# Patient Record
Sex: FEMALE | Race: WHITE | NOT HISPANIC OR LATINO | Employment: FULL TIME | URBAN - METROPOLITAN AREA
[De-identification: names, ages, dates, MRNs, and addresses within clinical notes are randomized per-mention and may not be internally consistent; named-entity substitution may affect disease eponyms.]

---

## 2017-05-15 ENCOUNTER — OFFICE VISIT (OUTPATIENT)
Dept: FAMILY MEDICINE | Facility: CLINIC | Age: 27
End: 2017-05-15
Payer: COMMERCIAL

## 2017-05-15 VITALS
HEART RATE: 60 BPM | WEIGHT: 164.6 LBS | DIASTOLIC BLOOD PRESSURE: 61 MMHG | BODY MASS INDEX: 25.78 KG/M2 | SYSTOLIC BLOOD PRESSURE: 117 MMHG

## 2017-05-15 DIAGNOSIS — F43.22 ADJUSTMENT DISORDER WITH ANXIOUS MOOD: Primary | ICD-10-CM

## 2017-05-15 PROCEDURE — 99213 OFFICE O/P EST LOW 20 MIN: CPT | Performed by: FAMILY MEDICINE

## 2017-05-15 ASSESSMENT — ANXIETY QUESTIONNAIRES
GAD7 TOTAL SCORE: 13
2. NOT BEING ABLE TO STOP OR CONTROL WORRYING: NEARLY EVERY DAY
6. BECOMING EASILY ANNOYED OR IRRITABLE: NEARLY EVERY DAY
1. FEELING NERVOUS, ANXIOUS, OR ON EDGE: MORE THAN HALF THE DAYS
7. FEELING AFRAID AS IF SOMETHING AWFUL MIGHT HAPPEN: SEVERAL DAYS
IF YOU CHECKED OFF ANY PROBLEMS ON THIS QUESTIONNAIRE, HOW DIFFICULT HAVE THESE PROBLEMS MADE IT FOR YOU TO DO YOUR WORK, TAKE CARE OF THINGS AT HOME, OR GET ALONG WITH OTHER PEOPLE: SOMEWHAT DIFFICULT
5. BEING SO RESTLESS THAT IT IS HARD TO SIT STILL: NOT AT ALL
3. WORRYING TOO MUCH ABOUT DIFFERENT THINGS: MORE THAN HALF THE DAYS

## 2017-05-15 ASSESSMENT — PATIENT HEALTH QUESTIONNAIRE - PHQ9: 5. POOR APPETITE OR OVEREATING: MORE THAN HALF THE DAYS

## 2017-05-15 NOTE — PROGRESS NOTES
SUBJECTIVE:                                                    Shonda Valdivia is a 27 year old female who presents to clinic today for the following health issues:    Chief Complaint   Patient presents with     Anxiety     New issue, she states her and her boyfriend have been together a little over a year and she has been staying with at his house. Last wednesday she stayed at her moms house and woke up feeling like she wanted nothing to do with him. She also states over the last week she feels disconnected and when she went out with friends she found herself distant and not paying attention.      Shonda Valdivia is a 27 year old female employed as a  at the [employer].  She is still living with her parents, but she has been in a romantic relationship for about a year now, has been spending more nights each week at her boyfriend's house, was considering moving in, and the couple had even talked about getting . She reports that all of a sudden she woke up one morning with the strong feeling that this was all a mistake and that she didn't really want to have anything to do with him.  She has shared these feelings, and states her boyfriend and her parents are giving her space to work out her feelings. She has made an appointment for counseling with Lupe Sales in [place] for next Monday.    ONe pertinent factor is that when she was 20, she was in another longterm relationship, and at about the same point in the relationship, one year in, she felt the same way, that she had nothing in common with her partner and needed to get out of the relationship. She is concerned that she is forming a pattern of abandonment, though in retrospect she still thinks her former boyfriend would not have been a good match.    She does have dreams of eventual marriage and children.    One other factor is that her current significant other was diagnosed in the past with a brain tumor. He has had  surgery and radiation and chemotherapy, and has been told the tumor is shrinking, but his longterm prognosis is not clear. He is employed, owns his house.  She gets along well with his family, who already treat her like a daughter, and he gets along well with her parents. She has had him come to the house to visit, but is no longer staying over with him until she gets things figured out.    She has never lived on her own, has always stayed in her parental home or with a boyfriend. She has never really thought about having period of independence.    She has two siblings, a younger brother who just got  now after he returned from deployment, and one older sister who has one child and is pregnant again.    OBJECTIVE: /61 (BP Location: Right arm, Patient Position: Chair, Cuff Size: Adult Regular)  Pulse 60  Wt 164 lb 9.6 oz (74.7 kg)  BMI 25.78 kg/m2    She is pleasant, articulate, appears concerned and confused over her feelings and options.    ASSESSMENT: adjustment disorder with anxious mood    PLAN: I applauded her decision to seek counseling to help her understand her options and her values and concerns.  At this point, no pharmaceutical intervention is indicated.  Recheck in one month PRN.    Bouchra Manzo md

## 2017-05-15 NOTE — MR AVS SNAPSHOT
"              After Visit Summary   5/15/2017    Shonda Valdivia    MRN: [id number]           Patient Information     Date Of Birth          1990        Visit Information        Provider Department      5/15/2017 1:40 PM Bouchra Manzo MD [facility]        Today's Diagnoses     Adjustment disorder with anxious mood    -  1      Care Instructions    Keep the counseling appointment.  Recheck in one month if needed.        Follow-ups after your visit        Your next 10 appointments already scheduled     Jun 01, 2017  9:00 AM CDT   PHYSICAL with Bouchra Manzo MD   [facility] ([facility])    [address]  [facility] [address]   [phone]              Who to contact     If you have questions or need follow up information about today's clinic visit or your schedule please contact [facility] directly at [phone].  Normal or non-critical lab and imaging results will be communicated to you by MyChart, letter or phone within 4 business days after the clinic has received the results. If you do not hear from us within 7 days, please contact the clinic through [employer] or phone. If you have a critical or abnormal lab result, we will notify you by phone as soon as possible.  Submit refill requests through [email or web address] or call your pharmacy and they will forward the refill request to us. Please allow 3 business days for your refill to be completed.          Additional Information About Your Visit        [employer] Information     [email or web address] lets you send messages to your doctor, view your test results, renew your prescriptions, schedule appointments and more. To sign up, go to [email or web address] . Click on \"Log in\" on the left side of the screen, which will take you to the Welcome page. Then click on \"Sign up Now\" on the right side of the page.     You will be asked to enter the access code listed below, as well as some " personal information. Please follow the directions to create your username and password.     Your access code is: [email or web address]  Expires: 2017  2:17 PM     Your access code will  in 90 days. If you need help or a new code, please call your [place] clinic or [phone].        Care EveryWhere ID     This is your Care EveryWhere ID. This could be used by other organizations to access your [place] medical records  [id number]        Your Vitals Were     Pulse BMI (Body Mass Index)                60 25.78 kg/m2           Blood Pressure from Last 3 Encounters:   05/15/17 117/61   10/17/16 110/62   06/09/15 110/60    Weight from Last 3 Encounters:   05/15/17 164 lb 9.6 oz (74.7 kg)   10/17/16 167 lb 3.2 oz (75.8 kg)   06/09/15 160 lb (72.6 kg)              Today, you had the following     No orders found for display       Primary Care Provider Office Phone # Fax #    R Eduardo Gandara [phone]       [place] [address]        Thank you!     Thank you for choosing [facility]  for your care. Our goal is always to provide you with excellent care. Hearing back from our patients is one way we can continue to improve our services. Please take a few minutes to complete the written survey that you may receive in the mail after your visit with us. Thank you!             Your Updated Medication List - Protect others around you: Learn how to safely use, store and throw away your medicines at www.disposemymeds.org.      Notice  As of 5/15/2017  2:17 PM    You have not been prescribed any medications.

## 2017-05-16 ASSESSMENT — PATIENT HEALTH QUESTIONNAIRE - PHQ9: SUM OF ALL RESPONSES TO PHQ QUESTIONS 1-9: 14

## 2017-05-16 ASSESSMENT — ANXIETY QUESTIONNAIRES: GAD7 TOTAL SCORE: 13

## 2017-06-22 ENCOUNTER — OFFICE VISIT (OUTPATIENT)
Dept: FAMILY MEDICINE | Facility: CLINIC | Age: 27
End: 2017-06-22
Payer: COMMERCIAL

## 2017-06-22 VITALS
WEIGHT: 165 LBS | HEART RATE: 68 BPM | HEIGHT: 67 IN | BODY MASS INDEX: 25.9 KG/M2 | DIASTOLIC BLOOD PRESSURE: 77 MMHG | SYSTOLIC BLOOD PRESSURE: 111 MMHG | RESPIRATION RATE: 18 BRPM

## 2017-06-22 DIAGNOSIS — B07.8 COMMON WART: Primary | ICD-10-CM

## 2017-06-22 PROCEDURE — 17110 DESTRUCTION B9 LES UP TO 14: CPT | Performed by: FAMILY MEDICINE

## 2017-06-22 NOTE — NURSING NOTE
"Chief Complaint   Patient presents with     Derm Problem     Patient has several warts on right leg that she has had removed and frooze and they return.        Initial /77  Pulse 68  Resp 18  Ht 5' 7\" (1.702 m)  Wt 165 lb (74.8 kg)  Breastfeeding? No  BMI 25.84 kg/m2 Estimated body mass index is 25.84 kg/(m^2) as calculated from the following:    Height as of this encounter: 5' 7\" (1.702 m).    Weight as of this encounter: 165 lb (74.8 kg).  Medication Reconciliation: complete    "

## 2017-06-22 NOTE — PROGRESS NOTES
SUBJECTIVE:                                                    Shonda Valdivia is a 27 year old female who presents to clinic today for the following health issues:      Chief Complaint   Patient presents with     Derm Problem     Patient has several warts on right leg that she has had removed and frooze and they return.      Shonda Valdivia is a 27 year old female has had 3 wart(s) for several month(s) and has not tried over-the counter anti-wart medications.  There is no history of infection or injury.  This is the patient's first treatment.  Has had other warts treated in this general location before.     O: The patient appears today in no apparent distress.  Vitals as above.  Skin: three non-erythematous, raised papule with pinpoint hemmorhages measuring 1mm-5mm is seen on the right distal medial thigh.       A: Common Wart(s).    P:  Each wart was frozen easily three times with liquid nitrogen.  A total of 3 warts are treated today.  The etiology of common warts were discussed.  Shonda will continue to use the over-the-counter medications such as Compound W under occlusion on a nightly  basis.  Warm soapy water soaks and sanding also recommended.  The patient is to return every two weeks until all warts have    Zulay Mark M.D.

## 2017-06-22 NOTE — PATIENT INSTRUCTIONS
Thank you for choosing [facility].  You may be receiving a survey in the mail from [facility] regarding your visit today.  Please take a few minutes to complete and return the survey to let us know how we are doing.      Our Clinic hours are:  Mondays    7:20 am - 7 pm  Tues -  Fri  7:20 am - 5 pm    Clinic Phone: [phone]    The clinic lab opens at 7:30 am Mon - Fri and appointments are required.    [facility]. [phone]  Monday-Thursday 8 am - 7pm  Tues/Wed/Fri 8 am - 5:30 pm

## 2017-06-22 NOTE — MR AVS SNAPSHOT
"              After Visit Summary   6/22/2017    Shonda Valdivia    MRN: [id number]           Patient Information     Date Of Birth          1990        Visit Information        Provider Department      6/22/2017 2:20 PM Zulay Mark MD [facility]        Today's Diagnoses     Common wart    -  1      Care Instructions          Thank you for choosing [facility].  You may be receiving a survey in the mail from [facility] regarding your visit today.  Please take a few minutes to complete and return the survey to let us know how we are doing.      Our Clinic hours are:  Mondays    7:20 am - 7 pm  Tues -  Fri  7:20 am - 5 pm    Clinic Phone: [phone]    The clinic lab opens at 7:30 am Mon - Fri and appointments are required.    [facility]  Ph. [phone]  Monday-Thursday 8 am - 7pm  Tues/Wed/Fri 8 am - 5:30 pm                 Follow-ups after your visit        Who to contact     If you have questions or need follow up information about today's clinic visit or your schedule please contact [facility] directly at [phone].  Normal or non-critical lab and imaging results will be communicated to you by MyChart, letter or phone within 4 business days after the clinic has received the results. If you do not hear from us within 7 days, please contact the clinic through MyChart or phone. If you have a critical or abnormal lab result, we will notify you by phone as soon as possible.  Submit refill requests through [employer] or call your pharmacy and they will forward the refill request to us. Please allow 3 business days for your refill to be completed.          Additional Information About Your Visit        MyChart Information     [employer] lets you send messages to your doctor, view your test results, renew your prescriptions, schedule appointments and more. To sign up, go to [email or web address] . Click on \"Log in\" on the left side of the " "screen, which will take you to the Welcome page. Then click on \"Sign up Now\" on the right side of the page.     You will be asked to enter the access code listed below, as well as some personal information. Please follow the directions to create your username and password.     Your access code is: [email or web address]  Expires: 2017  2:17 PM     Your access code will  in 90 days. If you need help or a new code, please call your [place] clinic or [phone].        Care EveryWhere ID     This is your Care EveryWhere ID. This could be used by other organizations to access your [place] medical records  [id number]        Your Vitals Were     Pulse Respirations Height Breastfeeding? BMI (Body Mass Index)       68 18 5' 7\" (1.702 m) No 25.84 kg/m2        Blood Pressure from Last 3 Encounters:   17 111/77   05/15/17 117/61   10/17/16 110/62    Weight from Last 3 Encounters:   17 165 lb (74.8 kg)   05/15/17 164 lb 9.6 oz (74.7 kg)   10/17/16 167 lb 3.2 oz (75.8 kg)              We Performed the Following     DESTRUCT BENIGN LESION, UP TO 14        Primary Care Provider Office Phone # Fax #    R Eduardo Gandara [phone]       [place] [address]        Equal Access to Services     BO BAINS AH: Hadii daniel aguilerao Soclari, waaxda luqadaha, qaybta kaalmada adeegyada, lani albarado. So [facility] [phone].    ATENCIÓN: Si habla español, tiene a donis disposición servicios gratuitos de asistencia lingüística. Llame al [phone].    We comply with applicable federal civil rights laws and [place] laws. We do not discriminate on the basis of race, color, national origin, age, disability sex, sexual orientation or gender identity.            Thank you!     Thank you for choosing [facility]  for your care. Our goal is always to provide you with excellent care. Hearing back from our patients is one way " we can continue to improve our services. Please take a few minutes to complete the written survey that you may receive in the mail after your visit with us. Thank you!             Your Updated Medication List - Protect others around you: Learn how to safely use, store and throw away your medicines at www.disposemymeds.org.      Notice  As of 6/22/2017  2:50 PM    You have not been prescribed any medications.

## 2017-10-06 ENCOUNTER — HOSPITAL ENCOUNTER (EMERGENCY)
Facility: CLINIC | Age: 27
Discharge: HOME | End: 2017-10-06
Admitting: FAMILY MEDICINE
Payer: COMMERCIAL

## 2017-10-06 VITALS
HEART RATE: 63 BPM | RESPIRATION RATE: 16 BRPM | OXYGEN SATURATION: 99 % | SYSTOLIC BLOOD PRESSURE: 161 MMHG | TEMPERATURE: 98.1 F | DIASTOLIC BLOOD PRESSURE: 89 MMHG

## 2017-10-06 LAB
DEPRECATED S PYO AG THROAT QL EIA: NORMAL
SPECIMEN SOURCE: NORMAL

## 2017-10-06 PROCEDURE — 87880 STREP A ASSAY W/OPTIC: CPT | Performed by: FAMILY MEDICINE

## 2017-10-06 PROCEDURE — 40000268 ZZH STATISTIC NO CHARGES

## 2017-10-06 PROCEDURE — 87081 CULTURE SCREEN ONLY: CPT | Performed by: FAMILY MEDICINE

## 2017-10-08 LAB
BACTERIA SPEC CULT: NORMAL
SPECIMEN SOURCE: NORMAL

## 2018-01-10 ENCOUNTER — TELEPHONE (OUTPATIENT)
Dept: FAMILY MEDICINE | Facility: CLINIC | Age: 28
End: 2018-01-10

## 2018-01-10 DIAGNOSIS — Z13.9 SCREENING FOR CONDITION: ICD-10-CM

## 2018-01-10 DIAGNOSIS — Z13.9 SCREENING FOR CONDITION: Primary | ICD-10-CM

## 2018-01-10 PROCEDURE — 86787 VARICELLA-ZOSTER ANTIBODY: CPT | Performed by: FAMILY MEDICINE

## 2018-01-10 PROCEDURE — 36415 COLL VENOUS BLD VENIPUNCTURE: CPT | Performed by: FAMILY MEDICINE

## 2018-01-10 NOTE — TELEPHONE ENCOUNTER
Reason for Call: Request for an order or referral:    Order or referral being requested: Titer    Date needed: as soon as possible    Has the patient been seen by the PCP for this problem? NO    Additional comments: Pt needs to prove immunity to chicken pox for school. Could she please get an order for a Titer?    Phone number Patient can be reached at:  Home number on file [phone] (home)    Best Time:  any    Call taken on 1/10/2018 at 10:03 AM by Dorothy Barrera

## 2018-01-11 LAB — VZV IGG SER QL IA: 2.1 AI (ref 0–0.8)

## 2018-02-09 ENCOUNTER — OFFICE VISIT (OUTPATIENT)
Dept: FAMILY MEDICINE | Facility: CLINIC | Age: 28
End: 2018-02-09
Payer: COMMERCIAL

## 2018-02-09 VITALS
SYSTOLIC BLOOD PRESSURE: 116 MMHG | HEIGHT: 67 IN | DIASTOLIC BLOOD PRESSURE: 68 MMHG | TEMPERATURE: 98.7 F | HEART RATE: 74 BPM | RESPIRATION RATE: 18 BRPM

## 2018-02-09 DIAGNOSIS — J01.90 ACUTE SINUSITIS WITH SYMPTOMS > 10 DAYS: Primary | ICD-10-CM

## 2018-02-09 PROCEDURE — 99214 OFFICE O/P EST MOD 30 MIN: CPT | Performed by: FAMILY MEDICINE

## 2018-02-09 ASSESSMENT — PAIN SCALES - GENERAL: PAINLEVEL: NO PAIN (0)

## 2018-02-09 NOTE — PATIENT INSTRUCTIONS
Sinusitis          What is sinusitis?   Sinusitis is swollen, infected linings of the sinuses. The sinuses are hollow spaces in the bones of your face and skull. They connect with the nose through small openings. Like the nose, their linings make mucus.   How does it occur?   Sinusitis occurs when the sinus linings become infected. The passageways from the sinuses to the nose are very narrow. Swelling and mucus may block the passageways. This leads to pressure changes in the sinuses that can be painful.   A number of things can cause swelling and sinusitis. Most often it's allergens (things that cause allergies, like pollen and mold) and viruses, such as viruses that cause the common cold. Whether the cause is allergies or a virus, the sinus linings can swell. When swelling causes the sinus passageway to swell shut, bacteria, viruses, and even fungus can be trapped in the sinuses and cause a sinus infection.   If your nasal bones have been injured or are deformed, causing partial blockage of the sinus openings, you are more likely to get sinusitis.   What are the symptoms?   Symptoms include:   feeling of fullness or pressure in your head   a headache that is most painful when you first wake up in the morning or when you bend your head down or forward   pain above or below your eyes   aching in the upper jaw and teeth   runny or stuffy nose   cough, especially at night   fluid draining down the back of your throat (postnasal drainage)   sore throat in the morning or evening.   How is it diagnosed?   Your healthcare provider will ask about your symptoms and will examine you. You may have an X-ray to look for swelling, fluid, or small benign growths (polyps) in the sinuses.   How is it treated?   Decongestants may help. They may be nonprescription or prescription. They are available as liquids, pills, and nose sprays.   Your healthcare provider may prescribe an antibiotic. In some cases you may need to  take decongestants and antibiotics for several weeks.   You may need nonprescription medicine for pain, such as acetaminophen or ibuprofen. Check with your healthcare provider before you give any medicine that contains aspirin or salicylates to a child or teen. This includes medicines like baby aspirin, some cold medicines, and Pepto Bismol. Children and teens who take aspirin are at risk for a serious illness called Reye's syndrome. Ibuprofen is an NSAID. Nonsteroidal anti-inflammatory medicines (NSAIDs) may cause stomach bleeding and other problems. These risks increase with age. Read the label and take as directed. Unless recommended by your healthcare provider, do not take NSAIDs for more than 10 days for any reason.   If you have chronic or repeated sinus infections, allergies may be the cause. Your healthcare provider may prescribe antihistamine tablets or prescription nasal sprays (steroids or cromolyn) to treat the allergies.   If you have chronic, severe sinusitis that does not respond to treatment with medicines, surgery may be done. The surgeon can create an extra or enlarged passageway in the wall of the sinus cavity. This allows the sinuses to drain more easily through the nasal passages. This should help them stay free of infection.   How long will the effects last?   Symptoms may get better gradually over 3 to 10 days. Depending on what caused the sinusitis and how severe it is, it may last for days or weeks. The symptoms may come back if you do not finish all of your antibiotic.   How can I take care of myself?   Follow your healthcare provider's instructions.   If you are taking an antibiotic, take all of it as directed by your provider. If you stop taking the medicine when your symptoms are gone but before you have taken all of the medicine, symptoms may come back.   Avoid tobacco smoke.   If you have allergies, take care to avoid the things you are allergic to, such as animal dander.   Add  moisture to the air with a humidifier or a vaporizer, unless you have mold allergy (mold may grow in your vaporizer).   Inhale steam from a basin of hot water or shower to help open your sinuses and relieve pain.   Use saline nasal sprays to help wash out nasal passages and clear some mucus from the airways.   Use decongestants as directed on the label or by your provider.   If you are using a nonprescription nasal-spray decongestant, generally you should not use it for more than 3 days. After 3 days it may cause your symptoms to get worse. Ask your healthcare provider if it is OK for you to use a nasal spray decongestant longer than this.   Get plenty of rest.   Drink more fluids to keep the mucus as thin as possible so your sinuses can drain more easily.   Put warm compresses on painful areas.   Take antibiotics as prescribed. Use all of the medicine, even after you feel better. Some sinus infections require 2 to 4 weeks of antibiotic treatment.   See your healthcare provider if the pain lasts for several days or gets worse.   If the sinus areas above or below your eyes are swollen or bulging, see your healthcare provider right away. This symptom may mean that the infection is spreading. A spreading infection can affect other parts of your body--even the brain--and needs to be treated promptly.   How can I help prevent sinusitis?   Treat your colds and allergies promptly. Use decongestants as soon as you start having symptoms.   Do not smoke and stay away from secondhand smoke.   Drink lots of fluids to keep the mucus thin.   Humidify your home if the air is particularly dry.   If you have sinus infections often, consider having allergy tests.   If sinusitis continues to be a problem despite treatment, you might need an exam by an ear, nose, and throat doctor (called an ENT or otolaryngologist). The specialist will check for polyps or a deformed bone that may be blocking your sinuses.     Published by  [employer].  This content is reviewed periodically and is subject to change as new health information becomes available. The information is intended to inform and educate and is not a replacement for medical evaluation, advice, diagnosis or treatment by a healthcare professional.   Developed by [employer].   ? 2010 [employer] and/or its affiliates. All Rights Reserved.   Copyright   [employer] Systems 2011  Adult Health Advisor            Thank you for choosing [facility].  You may be receiving a survey in the mail from [employer] regarding your visit today.  Please take a few minutes to complete and return the survey to let us know how we are doing.      Our Clinic hours are:  Mondays    7:20 am - 7 pm  Tues -  Fri  7:20 am - 5 pm    Clinic Phone: [phone]    The clinic lab opens at 7:30 am Mon - Fri and appointments are required.    [facility] [place] [facility]  Ph. [phone]  Monday-Thursday 8 am - 7pm  Tues/Wed/Fri 8 am - 5:30 pm

## 2018-02-09 NOTE — MR AVS SNAPSHOT
After Visit Summary   2/9/2018    Shonda Valdivia    MRN: [id number]           Patient Information     Date Of Birth          1990        Visit Information        Provider Department      2/9/2018 10:20 AM Zulay Mark MD [facility]        Today's Diagnoses     Acute sinusitis with symptoms > 10 days    -  1      Care Instructions                 Sinusitis          What is sinusitis?   Sinusitis is swollen, infected linings of the sinuses. The sinuses are hollow spaces in the bones of your face and skull. They connect with the nose through small openings. Like the nose, their linings make mucus.   How does it occur?   Sinusitis occurs when the sinus linings become infected. The passageways from the sinuses to the nose are very narrow. Swelling and mucus may block the passageways. This leads to pressure changes in the sinuses that can be painful.   A number of things can cause swelling and sinusitis. Most often it's allergens (things that cause allergies, like pollen and mold) and viruses, such as viruses that cause the common cold. Whether the cause is allergies or a virus, the sinus linings can swell. When swelling causes the sinus passageway to swell shut, bacteria, viruses, and even fungus can be trapped in the sinuses and cause a sinus infection.   If your nasal bones have been injured or are deformed, causing partial blockage of the sinus openings, you are more likely to get sinusitis.   What are the symptoms?   Symptoms include:   feeling of fullness or pressure in your head   a headache that is most painful when you first wake up in the morning or when you bend your head down or forward   pain above or below your eyes   aching in the upper jaw and teeth   runny or stuffy nose   cough, especially at night   fluid draining down the back of your throat (postnasal drainage)   sore throat in the morning or evening.   How is it diagnosed?   Your healthcare provider will  ask about your symptoms and will examine you. You may have an X-ray to look for swelling, fluid, or small benign growths (polyps) in the sinuses.   How is it treated?   Decongestants may help. They may be nonprescription or prescription. They are available as liquids, pills, and nose sprays.   Your healthcare provider may prescribe an antibiotic. In some cases you may need to take decongestants and antibiotics for several weeks.   You may need nonprescription medicine for pain, such as acetaminophen or ibuprofen. Check with your healthcare provider before you give any medicine that contains aspirin or salicylates to a child or teen. This includes medicines like baby aspirin, some cold medicines, and Pepto Bismol. Children and teens who take aspirin are at risk for a serious illness called Reye's syndrome. Ibuprofen is an NSAID. Nonsteroidal anti-inflammatory medicines (NSAIDs) may cause stomach bleeding and other problems. These risks increase with age. Read the label and take as directed. Unless recommended by your healthcare provider, do not take NSAIDs for more than 10 days for any reason.   If you have chronic or repeated sinus infections, allergies may be the cause. Your healthcare provider may prescribe antihistamine tablets or prescription nasal sprays (steroids or cromolyn) to treat the allergies.   If you have chronic, severe sinusitis that does not respond to treatment with medicines, surgery may be done. The surgeon can create an extra or enlarged passageway in the wall of the sinus cavity. This allows the sinuses to drain more easily through the nasal passages. This should help them stay free of infection.   How long will the effects last?   Symptoms may get better gradually over 3 to 10 days. Depending on what caused the sinusitis and how severe it is, it may last for days or weeks. The symptoms may come back if you do not finish all of your antibiotic.   How can I take care of myself?   Follow your  healthcare provider's instructions.   If you are taking an antibiotic, take all of it as directed by your provider. If you stop taking the medicine when your symptoms are gone but before you have taken all of the medicine, symptoms may come back.   Avoid tobacco smoke.   If you have allergies, take care to avoid the things you are allergic to, such as animal dander.   Add moisture to the air with a humidifier or a vaporizer, unless you have mold allergy (mold may grow in your vaporizer).   Inhale steam from a basin of hot water or shower to help open your sinuses and relieve pain.   Use saline nasal sprays to help wash out nasal passages and clear some mucus from the airways.   Use decongestants as directed on the label or by your provider.   If you are using a nonprescription nasal-spray decongestant, generally you should not use it for more than 3 days. After 3 days it may cause your symptoms to get worse. Ask your healthcare provider if it is OK for you to use a nasal spray decongestant longer than this.   Get plenty of rest.   Drink more fluids to keep the mucus as thin as possible so your sinuses can drain more easily.   Put warm compresses on painful areas.   Take antibiotics as prescribed. Use all of the medicine, even after you feel better. Some sinus infections require 2 to 4 weeks of antibiotic treatment.   See your healthcare provider if the pain lasts for several days or gets worse.   If the sinus areas above or below your eyes are swollen or bulging, see your healthcare provider right away. This symptom may mean that the infection is spreading. A spreading infection can affect other parts of your body--even the brain--and needs to be treated promptly.   How can I help prevent sinusitis?   Treat your colds and allergies promptly. Use decongestants as soon as you start having symptoms.   Do not smoke and stay away from secondhand smoke.   Drink lots of fluids to keep the mucus thin.   Humidify your home  if the air is particularly dry.   If you have sinus infections often, consider having allergy tests.   If sinusitis continues to be a problem despite treatment, you might need an exam by an ear, nose, and throat doctor (called an ENT or otolaryngologist). The specialist will check for polyps or a deformed bone that may be blocking your sinuses.     Published by [employer].  This content is reviewed periodically and is subject to change as new health information becomes available. The information is intended to inform and educate and is not a replacement for medical evaluation, advice, diagnosis or treatment by a healthcare professional.   Developed by [employer].   ? 2010 [employer] and/or its affiliates. All Rights Reserved.   Copyright   Clinical Reference Systems 2011  Adult Health Advisor            Thank you for choosing [facility].  You may be receiving a survey in the mail from [employer] regarding your visit today.  Please take a few minutes to complete and return the survey to let us know how we are doing.      Our Clinic hours are:  Mondays    7:20 am - 7 pm  Tues -  Fri  7:20 am - 5 pm    Clinic Phone: [phone]    The clinic lab opens at 7:30 am Mon - Fri and appointments are required.    [facility]  Ph. [phone]  Monday-Thursday 8 am - 7pm  Tues/Wed/Fri 8 am - 5:30 pm                 Follow-ups after your visit        Who to contact     If you have questions or need follow up information about today's clinic visit or your schedule please contact [facility] directly at [phone].  Normal or non-critical lab and imaging results will be communicated to you by MyChart, letter or phone within 4 business days after the clinic has received the results. If you do not hear from us within 7 days, please contact the clinic through MyChart or phone. If you have a critical or abnormal lab result, we will notify you by phone as soon as possible.  Submit  "refill requests through [employer] or call your pharmacy and they will forward the refill request to us. Please allow 3 business days for your refill to be completed.          Additional Information About Your Visit        zintinharAgari Information     Work[employer] lets you send messages to your doctor, view your test results, renew your prescriptions, schedule appointments and more. To sign up, go to [email or web address] . Click on \"Log in\" on the left side of the screen, which will take you to the Welcome page. Then click on \"Sign up Now\" on the right side of the page.     You will be asked to enter the access code listed below, as well as some personal information. Please follow the directions to create your username and password.     Your access code is: [email or web address]  Expires: 5/10/2018 10:30 AM     Your access code will  in 90 days. If you need help or a new code, please call your [facility] clinic or [phone].        Care EveryWhere ID     This is your Care EveryWhere ID. This could be used by other organizations to access your [facility] medical records  [id number]        Your Vitals Were     Pulse Temperature Respirations Height Breastfeeding?       74 98.7  F (37.1  C) (Tympanic) 18 5' 7\" (1.702 m) No        Blood Pressure from Last 3 Encounters:   18 116/68   10/06/17 161/89   17 111/77    Weight from Last 3 Encounters:   17 165 lb (74.8 kg)   05/15/17 164 lb 9.6 oz (74.7 kg)   10/17/16 167 lb 3.2 oz (75.8 kg)              Today, you had the following     No orders found for display         Today's Medication Changes          These changes are accurate as of 18 10:30 AM.  If you have any questions, ask your nurse or doctor.               Start taking these medicines.        Dose/Directions    amoxicillin-clavulanate 875-125 MG per tablet   Commonly known as:  AUGMENTIN   Used for:  Acute sinusitis with symptoms > 10 days   Started by:  Zulay Mark MD        Dose:  1 tablet   Take 1 " tablet by mouth 2 times daily   Quantity:  20 tablet   Refills:  0            Where to get your medicines      Some of these will need a paper prescription and others can be bought over the counter.  Ask your nurse if you have questions.     Bring a paper prescription for each of these medications     amoxicillin-clavulanate 875-125 MG per tablet                Primary Care Provider Office Phone # Fax #    R Eduardo Gandara [phone] [address] [facility] [address]        Equal Access to Services     BO BAINS : Hadii aad ku hadasho Soomaali, waaxda luqadaha, qaybta kaalmada adeegyada, waxay idiin hayaan adeeg kharash lasai albarado. So [facility] [phone].    ATENCIÓN: Si habla español, tiene a donis disposición servicios gratuitos de asistencia lingüística. [facility] [phone].    We comply with applicable federal civil rights laws and [place] laws. We do not discriminate on the basis of race, color, national origin, age, disability, sex, sexual orientation, or gender identity.            Thank you!     Thank you for choosing [facility]  for your care. Our goal is always to provide you with excellent care. Hearing back from our patients is one way we can continue to improve our services. Please take a few minutes to complete the written survey that you may receive in the mail after your visit with us. Thank you!             Your Updated Medication List - Protect others around you: Learn how to safely use, store and throw away your medicines at www.disposemymeds.org.          This list is accurate as of 2/9/18 10:30 AM.  Always use your most recent med list.                   Brand Name Dispense Instructions for use Diagnosis    amoxicillin-clavulanate 875-125 MG per tablet    AUGMENTIN    20 tablet    Take 1 tablet by mouth 2 times daily    Acute sinusitis with symptoms > 10 days

## 2018-02-09 NOTE — PROGRESS NOTES
"  SUBJECTIVE:   Shonda Valdivia is a 27 year old female who presents to clinic today for the following health issues:      Acute Illness   Acute illness concerns: sinus  Onset: 1.5 weeks    Fever: no     Chills/Sweats: no     Headache (location?): YES    Sinus Pressure:YES- tender and teeth pain    Conjunctivitis:  no    Ear Pain: YES: right    Rhinorrhea: YES    Congestion: YES    Sore Throat: no      Cough: no    Wheeze: no     Decreased Appetite: no     Nausea: no     Vomiting: no     Diarrhea:  no     Dysuria/Freq.: no     Fatigue/Achiness: YES    Sick/Strep Exposure: YES- public     Therapies Tried and outcome: theraflu      /68  Pulse 74  Temp 98.7  F (37.1  C) (Tympanic)  Resp 18  Ht 5' 7\" (1.702 m)  Breastfeeding? No  EXAM: GENERAL APPEARANCE: Alert, no acute distress  HENT: right TM normal, left TM normal, oral mucous membranes moist, oropharynx clear, frontal sinus tenderness bilateral, maxillary sinus tenderness bilateral  RESP: lungs clear to auscultation   CV: normal rate, regular rhythm, no murmur or gallop  ABDOMEN: soft, no organomegaly, masses or tenderness      ASSESSMENT/PLAN:      ICD-10-CM    1. Acute sinusitis with symptoms > 10 days J01.90 amoxicillin-clavulanate (AUGMENTIN) 875-125 MG per tablet     Risks, benefits and alternatives discussed for sinus treatment.   Advocated for mucinex, netti pot, etc and giving this a few more days.  Given prescription to fill if needed over the next 4-7 days if symptoms worsen.   She was also instructed to use probiotics if she does take the Augmentin.     Zulay Mark M.D.      Patient Instructions                Sinusitis          What is sinusitis?   Sinusitis is swollen, infected linings of the sinuses. The sinuses are hollow spaces in the bones of your face and skull. They connect with the nose through small openings. Like the nose, their linings make mucus.   How does it occur?   Sinusitis occurs when the sinus linings become " infected. The passageways from the sinuses to the nose are very narrow. Swelling and mucus may block the passageways. This leads to pressure changes in the sinuses that can be painful.   A number of things can cause swelling and sinusitis. Most often it's allergens (things that cause allergies, like pollen and mold) and viruses, such as viruses that cause the common cold. Whether the cause is allergies or a virus, the sinus linings can swell. When swelling causes the sinus passageway to swell shut, bacteria, viruses, and even fungus can be trapped in the sinuses and cause a sinus infection.   If your nasal bones have been injured or are deformed, causing partial blockage of the sinus openings, you are more likely to get sinusitis.   What are the symptoms?   Symptoms include:   feeling of fullness or pressure in your head   a headache that is most painful when you first wake up in the morning or when you bend your head down or forward   pain above or below your eyes   aching in the upper jaw and teeth   runny or stuffy nose   cough, especially at night   fluid draining down the back of your throat (postnasal drainage)   sore throat in the morning or evening.   How is it diagnosed?   Your healthcare provider will ask about your symptoms and will examine you. You may have an X-ray to look for swelling, fluid, or small benign growths (polyps) in the sinuses.   How is it treated?   Decongestants may help. They may be nonprescription or prescription. They are available as liquids, pills, and nose sprays.   Your healthcare provider may prescribe an antibiotic. In some cases you may need to take decongestants and antibiotics for several weeks.   You may need nonprescription medicine for pain, such as acetaminophen or ibuprofen. Check with your healthcare provider before you give any medicine that contains aspirin or salicylates to a child or teen. This includes medicines like baby aspirin, some cold medicines, and Pepto  Bismol. Children and teens who take aspirin are at risk for a serious illness called Reye's syndrome. Ibuprofen is an NSAID. Nonsteroidal anti-inflammatory medicines (NSAIDs) may cause stomach bleeding and other problems. These risks increase with age. Read the label and take as directed. Unless recommended by your healthcare provider, do not take NSAIDs for more than 10 days for any reason.   If you have chronic or repeated sinus infections, allergies may be the cause. Your healthcare provider may prescribe antihistamine tablets or prescription nasal sprays (steroids or cromolyn) to treat the allergies.   If you have chronic, severe sinusitis that does not respond to treatment with medicines, surgery may be done. The surgeon can create an extra or enlarged passageway in the wall of the sinus cavity. This allows the sinuses to drain more easily through the nasal passages. This should help them stay free of infection.   How long will the effects last?   Symptoms may get better gradually over 3 to 10 days. Depending on what caused the sinusitis and how severe it is, it may last for days or weeks. The symptoms may come back if you do not finish all of your antibiotic.   How can I take care of myself?   Follow your healthcare provider's instructions.   If you are taking an antibiotic, take all of it as directed by your provider. If you stop taking the medicine when your symptoms are gone but before you have taken all of the medicine, symptoms may come back.   Avoid tobacco smoke.   If you have allergies, take care to avoid the things you are allergic to, such as animal dander.   Add moisture to the air with a humidifier or a vaporizer, unless you have mold allergy (mold may grow in your vaporizer).   Inhale steam from a basin of hot water or shower to help open your sinuses and relieve pain.   Use saline nasal sprays to help wash out nasal passages and clear some mucus from the airways.   Use decongestants as directed  on the label or by your provider.   If you are using a nonprescription nasal-spray decongestant, generally you should not use it for more than 3 days. After 3 days it may cause your symptoms to get worse. Ask your healthcare provider if it is OK for you to use a nasal spray decongestant longer than this.   Get plenty of rest.   Drink more fluids to keep the mucus as thin as possible so your sinuses can drain more easily.   Put warm compresses on painful areas.   Take antibiotics as prescribed. Use all of the medicine, even after you feel better. Some sinus infections require 2 to 4 weeks of antibiotic treatment.   See your healthcare provider if the pain lasts for several days or gets worse.   If the sinus areas above or below your eyes are swollen or bulging, see your healthcare provider right away. This symptom may mean that the infection is spreading. A spreading infection can affect other parts of your body--even the brain--and needs to be treated promptly.   How can I help prevent sinusitis?   Treat your colds and allergies promptly. Use decongestants as soon as you start having symptoms.   Do not smoke and stay away from secondhand smoke.   Drink lots of fluids to keep the mucus thin.   Humidify your home if the air is particularly dry.   If you have sinus infections often, consider having allergy tests.   If sinusitis continues to be a problem despite treatment, you might need an exam by an ear, nose, and throat doctor (called an ENT or otolaryngologist). The specialist will check for polyps or a deformed bone that may be blocking your sinuses.     Published by [employer].  This content is reviewed periodically and is subject to change as new health information becomes available. The information is intended to inform and educate and is not a replacement for medical evaluation, advice, diagnosis or treatment by a healthcare professional.   Developed by [employer].   ? 2010 [employer] and/or its affiliates.  All Rights Reserved.   Copyright   Clinical Reference Systems 2011  Adult Health Advisor            Thank you for choosing [facility].  You may be receiving a survey in the mail from [employer] regarding your visit today.  Please take a few minutes to complete and return the survey to let us know how we are doing.      Our Clinic hours are:  Mondays    7:20 am - 7 pm  Tues -  Fri  7:20 am - 5 pm    Clinic Phone: [phone]    The clinic lab opens at 7:30 am Mon - Fri and appointments are required.    [facility]  Ph. [phone]  Monday-Thursday 8 am - 7pm  Tues/Wed/Fri 8 am - 5:30 pm

## 2018-02-09 NOTE — NURSING NOTE
"Chief Complaint   Patient presents with     Ent Problem       Initial /68  Pulse 74  Temp 98.7  F (37.1  C) (Tympanic)  Resp 18  Ht 5' 7\" (1.702 m)  Breastfeeding? No Estimated body mass index is 25.84 kg/(m^2) as calculated from the following:    Height as of 6/22/17: 5' 7\" (1.702 m).    Weight as of 6/22/17: 165 lb (74.8 kg).  Medication Reconciliation: complete    "

## 2018-05-09 ENCOUNTER — TELEPHONE (OUTPATIENT)
Dept: FAMILY MEDICINE | Facility: CLINIC | Age: 28
End: 2018-05-09

## 2018-05-09 NOTE — TELEPHONE ENCOUNTER
Panel Management Review      Patient has the following on her problem list: None      Composite cancer screening  Chart review shows that this patient is due/due soon for the following Pap Smear  Summary:    Patient is due/failing the following:   PAP    Action needed:   Patient needs office visit for pap.    Type of outreach:    Phone, spoke to patient.  agreed to pap     Questions for provider review:    None                                                                                                                                    Rebecca Alonzo MA       Chart routed to Care Team .

## 2018-05-13 ENCOUNTER — HEALTH MAINTENANCE LETTER (OUTPATIENT)
Age: 28
End: 2018-05-13

## 2018-06-07 ENCOUNTER — TELEPHONE (OUTPATIENT)
Dept: FAMILY MEDICINE | Facility: CLINIC | Age: 28
End: 2018-06-07

## 2018-06-07 DIAGNOSIS — Z11.1 SCREENING EXAMINATION FOR PULMONARY TUBERCULOSIS: Primary | ICD-10-CM

## 2018-06-07 PROCEDURE — 86480 TB TEST CELL IMMUN MEASURE: CPT | Performed by: FAMILY MEDICINE

## 2018-06-07 PROCEDURE — 36415 COLL VENOUS BLD VENIPUNCTURE: CPT | Performed by: FAMILY MEDICINE

## 2018-06-07 NOTE — TELEPHONE ENCOUNTER
Sher Gold Order    Reason for Call: Request for an order or referral:    Order or referral being requested: Pt is going to college and needs her PCP to order a Quantiferon Gold TB test for her.      Date needed: at your convenience    Has the patient been seen by the PCP for this problem? NO    Additional comments:     Phone number Patient can be reached at:  Home number on file [phone] (home)    Best Time:  any    Can we leave a detailed message on this number?  YES    Call taken on 6/7/2018 at 12:53 PM by Dorothy Denis

## 2018-06-07 NOTE — TELEPHONE ENCOUNTER
Lab ready.  She saw you last 2 years, most recent 2-9-18.  Dr. Gandara saw back in 2016.    Routing to provider.  Eloisa SCOTT RN

## 2018-06-11 ENCOUNTER — OFFICE VISIT (OUTPATIENT)
Dept: FAMILY MEDICINE | Facility: CLINIC | Age: 28
End: 2018-06-11
Payer: COMMERCIAL

## 2018-06-11 VITALS
TEMPERATURE: 97.4 F | SYSTOLIC BLOOD PRESSURE: 126 MMHG | HEART RATE: 72 BPM | WEIGHT: 164 LBS | RESPIRATION RATE: 18 BRPM | BODY MASS INDEX: 25.74 KG/M2 | HEIGHT: 67 IN | DIASTOLIC BLOOD PRESSURE: 88 MMHG

## 2018-06-11 DIAGNOSIS — Z11.3 SCREEN FOR STD (SEXUALLY TRANSMITTED DISEASE): ICD-10-CM

## 2018-06-11 DIAGNOSIS — Z12.4 SCREENING FOR MALIGNANT NEOPLASM OF CERVIX: ICD-10-CM

## 2018-06-11 DIAGNOSIS — Z00.00 ROUTINE GENERAL MEDICAL EXAMINATION AT A HEALTH CARE FACILITY: Primary | ICD-10-CM

## 2018-06-11 LAB
M TB TUBERC IFN-G BLD QL: NEGATIVE
M TB TUBERC IFN-G/MITOGEN IGNF BLD: 0.01 IU/ML

## 2018-06-11 PROCEDURE — G0124 SCREEN C/V THIN LAYER BY MD: HCPCS | Performed by: FAMILY MEDICINE

## 2018-06-11 PROCEDURE — 87491 CHLMYD TRACH DNA AMP PROBE: CPT | Performed by: FAMILY MEDICINE

## 2018-06-11 PROCEDURE — G0476 HPV COMBO ASSAY CA SCREEN: HCPCS | Performed by: FAMILY MEDICINE

## 2018-06-11 PROCEDURE — 87591 N.GONORRHOEAE DNA AMP PROB: CPT | Performed by: FAMILY MEDICINE

## 2018-06-11 PROCEDURE — 99395 PREV VISIT EST AGE 18-39: CPT | Performed by: FAMILY MEDICINE

## 2018-06-11 PROCEDURE — G0145 SCR C/V CYTO,THINLAYER,RESCR: HCPCS | Performed by: FAMILY MEDICINE

## 2018-06-11 ASSESSMENT — PAIN SCALES - GENERAL: PAINLEVEL: NO PAIN (0)

## 2018-06-11 NOTE — PROGRESS NOTES
SUBJECTIVE:   CC: Shonda Valdivia is an 28 year old woman who presents for preventive health visit.     Healthy Habits:    Do you get at least three servings of calcium containing foods daily (dairy, green leafy vegetables, etc.)? yes    Amount of exercise or daily activities, outside of work: none    Problems taking medications regularly No    Medication side effects: No    Have you had an eye exam in the past two years? no    Do you see a dentist twice per year? yes    Do you have sleep apnea, excessive snoring or daytime drowsiness?no          Today's PHQ-2 Score:   PHQ-2 ( 1999 Pf[employer]) 6/11/2018 2/9/2018   Q1: Little interest or pleasure in doing things 0 0   Q2: Feeling down, depressed or hopeless 0 0   PHQ-2 Score 0 0       Abuse: Current or Past(Physical, Sexual or Emotional)- No  Do you feel safe in your environment - Yes    Social History   Substance Use Topics     Smoking status: Never Smoker     Smokeless tobacco: Never Used     Alcohol use Yes      Comment: occ     If you drink alcohol do you typically have >3 drinks per day or >7 drinks per week? No                     Reviewed orders with patient.  Reviewed health maintenance and updated orders accordingly - Yes  Labs reviewed in EPIC  BP Readings from Last 3 Encounters:   06/11/18 126/88   02/09/18 116/68   10/06/17 161/89    Wt Readings from Last 3 Encounters:   06/11/18 164 lb (74.4 kg)   06/22/17 165 lb (74.8 kg)   05/15/17 164 lb 9.6 oz (74.7 kg)                    Mammogram not appropriate for this patient based on age.    Pertinent mammograms are reviewed under the imaging tab.  History of abnormal Pap smear: NO - age 21-29 PAP every 3 years recommended    Reviewed and updated as needed this visit by clinical staff  Tobacco  Allergies  Meds         Reviewed and updated as needed this visit by Provider            ROS:  CONSTITUTIONAL: NEGATIVE for fever, chills, change in weight  INTEGUMENTARU/SKIN: NEGATIVE for worrisome rashes, moles  "or lesions  EYES: NEGATIVE for vision changes or irritation  ENT: NEGATIVE for ear, mouth and throat problems  RESP: NEGATIVE for significant cough or SOB  BREAST: NEGATIVE for masses, tenderness or discharge  CV: NEGATIVE for chest pain, palpitations or peripheral edema  GI: NEGATIVE for nausea, abdominal pain, heartburn, or change in bowel habits  : NEGATIVE for unusual urinary or vaginal symptoms. Periods are regular.  MUSCULOSKELETAL: NEGATIVE for significant arthralgias or myalgia  NEURO: NEGATIVE for weakness, dizziness or paresthesias  PSYCHIATRIC: NEGATIVE for changes in mood or affect    OBJECTIVE:   Resp 18  Ht 5' 7\" (1.702 m)  Wt 164 lb (74.4 kg)  LMP 05/31/2018  Breastfeeding? No  BMI 25.69 kg/m2  EXAM:  GENERAL: healthy, alert and no distress  EYES: Eyes grossly normal to inspection, PERRL and conjunctivae and sclerae normal  HENT: ear canals and TM's normal, nose and mouth without ulcers or lesions  NECK: no adenopathy, no asymmetry, masses, or scars and thyroid normal to palpation  RESP: lungs clear to auscultation - no rales, rhonchi or wheezes  BREAST: declined  CV: regular rate and rhythm, normal S1 S2, no S3 or S4, no murmur, click or rub, no peripheral edema and peripheral pulses strong  ABDOMEN: soft, nontender, no hepatosplenomegaly, no masses and bowel sounds normal   (female): normal female external genitalia, normal urethral meatus , vaginal mucosa pink, moist, well rugated and normal cervix, adnexae, and uterus without masses.  MS: no gross musculoskeletal defects noted, no edema  SKIN: no suspicious lesions or rashes  NEURO: Normal strength and tone, mentation intact and speech normal  PSYCH: mentation appears normal, affect normal/bright    ASSESSMENT/PLAN:   1. Routine general medical examination at a health care facility     - PAP imaged thin layer screen reflex to HPV if ASCUS - recommended age 25 - 29 years  - Neisseria gonorrhoeae PCR  - Chlamydia trachomatis PCR    2. " "Screening for malignant neoplasm of cervix     - PAP imaged thin layer screen reflex to HPV if ASCUS - recommended age 25 - 29 years    3. Screen for STD (sexually transmitted disease)     - Neisseria gonorrhoeae PCR  - Chlamydia trachomatis PCR    COUNSELING:   Reviewed preventive health counseling, as reflected in patient instructions       Regular exercise       Healthy diet/nutrition         reports that she has never smoked. She has never used smokeless tobacco.    Estimated body mass index is 25.69 kg/(m^2) as calculated from the following:    Height as of this encounter: 5' 7\" (1.702 m).    Weight as of this encounter: 164 lb (74.4 kg).   Weight management plan: Discussed healthy diet and exercise guidelines and patient will follow up in 12 months in clinic to re-evaluate.    Counseling Resources:  ATP IV Guidelines  Pooled Cohorts Equation Calculator  Breast Cancer Risk Calculator  FRAX Risk Assessment  ICSI Preventive Guidelines  Dietary Guidelines for Americans, 2010  USDA's MyPlate  ASA Prophylaxis  Lung CA Screening    Zulay Mark MD  [facility]  "

## 2018-06-11 NOTE — MR AVS SNAPSHOT
After Visit Summary   6/11/2018    Shonda Valdivia    MRN: [id number]           Patient Information     Date Of Birth          1990        Visit Information        Provider Department      6/11/2018 6:20 PM Zulay Mark MD [facility]        Today's Diagnoses     Routine general medical examination at a health care facility    -  1    Screening for malignant neoplasm of cervix          Care Instructions          Thank you for choosing [facility].  You may be receiving a survey in the mail from [facility] [employer] regarding your visit today.  Please take a few minutes to complete and return the survey to let us know how we are doing.      Our Clinic hours are:  Mondays    7:20 am - 7 pm  Tues -  Fri  7:20 am - 5 pm    Clinic Phone: [phone]    The clinic lab opens at 7:30 am Mon - Fri and appointments are required.    [facility]  Ph. [phone]  Monday-Thursday 8 am - 7pm  Tues/Wed/Fri 8 am - 5:30 pm           Preventive Health Recommendations  Female Ages 26 - 39  Yearly exam:   See your health care provider every year in order to    Review health changes.     Discuss preventive care.      Review your medicines if you your doctor has prescribed any.    Until age 30: Get a Pap test every three years (more often if you have had an abnormal result).    After age 30: Talk to your doctor about whether you should have a Pap test every 3 years or have a Pap test with HPV screening every 5 years.   You do not need a Pap test if your uterus was removed (hysterectomy) and you have not had cancer.  You should be tested each year for STDs (sexually transmitted diseases), if you're at risk.   Talk to your provider about how often to have your cholesterol checked.  If you are at risk for diabetes, you should have a diabetes test (fasting glucose).  Shots: Get a flu shot each year. Get a tetanus shot every 10 years.   Nutrition:     Eat at least 5 servings of  fruits and vegetables each day.    Eat whole-grain bread, whole-wheat pasta and brown rice instead of white grains and rice.    Talk to your provider about Calcium and Vitamin D.     Lifestyle    Exercise at least 150 minutes a week (30 minutes a day, 5 days of the week). This will help you control your weight and prevent disease.    Limit alcohol to one drink per day.    No smoking.     Wear sunscreen to prevent skin cancer.    See your dentist every six months for an exam and cleaning.            Follow-ups after your visit        Who to contact     If you have questions or need follow up information about today's clinic visit or your schedule please contact [facility] directly at [phone].  Normal or non-critical lab and imaging results will be communicated to you by DTThart, letter or phone within 4 business days after the clinic has received the results. If you do not hear from us within 7 days, please contact the clinic through [employer] or phone. If you have a critical or abnormal lab result, we will notify you by phone as soon as possible.  Submit refill requests through [employer] or call your pharmacy and they will forward the refill request to us. Please allow 3 business days for your refill to be completed.          Additional Information About Your Visit        [employer] Information     [employer] gives you secure access to your electronic health record. If you see a primary care provider, you can also send messages to your care team and make appointments. If you have questions, please call your primary care clinic.  If you do not have a primary care provider, please call [phone] and they will assist you.        Care EveryWhere ID     This is your Care EveryWhere ID. This could be used by other organizations to access your [facility] medical records  [id number]        Your Vitals Were     Pulse Temperature Respirations Height Last Period Breastfeeding?    72 97.4  F (36.3  C)  "(Tympanic) 18 5' 7\" (1.702 m) 05/31/2018 No    BMI (Body Mass Index)                   25.69 kg/m2            Blood Pressure from Last 3 Encounters:   06/11/18 126/88   02/09/18 116/68   10/06/17 161/89    Weight from Last 3 Encounters:   06/11/18 164 lb (74.4 kg)   06/22/17 165 lb (74.8 kg)   05/15/17 164 lb 9.6 oz (74.7 kg)              We Performed the Following     PAP imaged thin layer screen reflex to HPV if ASCUS - recommended age 25 - 29 years        Primary Care Provider Office Phone # Fax #    Zulay Mark [phone] [address] [facility] [address]        Equal Access to Services     BO BAINS : Hadii daniel aguilerao Soclari, waaxda luqadaha, qaybta kaalmada adeegyada, lani alfred . So [facility] [phone].    ATENCIÓN: Si habla español, tiene a donis disposición servicios gratuitos de asistencia lingüística. Llame al [phone].    We comply with applicable federal civil rights laws and [place] laws. We do not discriminate on the basis of race, color, national origin, age, disability, sex, sexual orientation, or gender identity.            Thank you!     Thank you for choosing [facility]  for your care. Our goal is always to provide you with excellent care. Hearing back from our patients is one way we can continue to improve our services. Please take a few minutes to complete the written survey that you may receive in the mail after your visit with us. Thank you!             Your Updated Medication List - Protect others around you: Learn how to safely use, store and throw away your medicines at www.disposemymeds.org.      Notice  As of 6/11/2018  6:36 PM    You have not been prescribed any medications.      "

## 2018-06-11 NOTE — PATIENT INSTRUCTIONS
Thank you for choosing [facility].  You may be receiving a survey in the mail from [employer] [facility] regarding your visit today.  Please take a few minutes to complete and return the survey to let us know how we are doing.      Our Clinic hours are:  Mondays    7:20 am - 7 pm  Tues - Fri  7:20 am - 5 pm    Clinic Phone: [phone]    The clinic lab opens at 7:30 am Mon - Fri and appointments are required.    [facility]. [phone]  Monday-Thursday 8 am - 7pm  Tues/Wed/Fri 8 am - 5:30 pm           Preventive Health Recommendations  Female Ages 26 - 39  Yearly exam:   See your health care provider every year in order to    Review health changes.     Discuss preventive care.      Review your medicines if you your doctor has prescribed any.    Until age 30: Get a Pap test every three years (more often if you have had an abnormal result).    After age 30: Talk to your doctor about whether you should have a Pap test every 3 years or have a Pap test with HPV screening every 5 years.   You do not need a Pap test if your uterus was removed (hysterectomy) and you have not had cancer.  You should be tested each year for STDs (sexually transmitted diseases), if you're at risk.   Talk to your provider about how often to have your cholesterol checked.  If you are at risk for diabetes, you should have a diabetes test (fasting glucose).  Shots: Get a flu shot each year. Get a tetanus shot every 10 years.   Nutrition:     Eat at least 5 servings of fruits and vegetables each day.    Eat whole-grain bread, whole-wheat pasta and brown rice instead of white grains and rice.    Talk to your provider about Calcium and Vitamin D.     Lifestyle    Exercise at least 150 minutes a week (30 minutes a day, 5 days of the week). This will help you control your weight and prevent disease.    Limit alcohol to one drink per day.    No smoking.     Wear sunscreen to prevent skin cancer.    See your dentist every  six months for an exam and cleaning.

## 2018-06-13 LAB
C TRACH DNA SPEC QL NAA+PROBE: NEGATIVE
N GONORRHOEA DNA SPEC QL NAA+PROBE: NEGATIVE
SPECIMEN SOURCE: NORMAL
SPECIMEN SOURCE: NORMAL

## 2018-06-15 LAB
COPATH REPORT: ABNORMAL
PAP: ABNORMAL

## 2018-06-18 LAB
FINAL DIAGNOSIS: ABNORMAL
HPV HR 12 DNA CVX QL NAA+PROBE: NEGATIVE
HPV16 DNA SPEC QL NAA+PROBE: POSITIVE
HPV18 DNA SPEC QL NAA+PROBE: NEGATIVE
SPECIMEN DESCRIPTION: ABNORMAL
SPECIMEN SOURCE CVX/VAG CYTO: ABNORMAL

## 2018-06-19 ENCOUNTER — RESULT FOLLOW UP (OUTPATIENT)
Dept: FAMILY MEDICINE | Facility: CLINIC | Age: 28
End: 2018-06-19

## 2018-06-19 DIAGNOSIS — R87.619 ATYPICAL ENDOCERVICAL CELLS ON PAP SMEAR: ICD-10-CM

## 2018-06-19 DIAGNOSIS — Z00.00 ROUTINE GENERAL MEDICAL EXAMINATION AT A HEALTH CARE FACILITY: Primary | ICD-10-CM

## 2018-06-19 DIAGNOSIS — R87.810 CERVICAL HIGH RISK HPV (HUMAN PAPILLOMAVIRUS) TEST POSITIVE: ICD-10-CM

## 2018-06-19 PROBLEM — R87.610 ASCUS WITH POSITIVE HIGH RISK HPV CERVICAL: Status: ACTIVE | Noted: 2018-06-11

## 2018-06-19 PROBLEM — R87.610 ASCUS WITH POSITIVE HIGH RISK HPV CERVICAL: Status: ACTIVE | Noted: 2018-06-19

## 2018-06-19 NOTE — PROGRESS NOTES
6/11/2018 Pap: ASCUS, +HR HPV type 16 (not 18 or other). Plan [facility]-  6/19/18 Message left to return call. Pt notified.   6/29/18 [facility] Bx's & ECC - Negative. Plan [facility] in 1 year.   7/2/18 provider released result to Compliance 11. Pt read result same day.   6/12/19 [facility] reminder letter sent (rlm)  8/2/19 [facility] pap - atypical endocervical cells, + HR HPV 16 (Neg 18 & other). Plan [facility] with ECC.   8/12/19 Message left to return call. (moo) Pt notified. 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 ECC & EMB - benign. Plan  [facility] in the OR. (moo)  10/3/19 Pt notified. (moo)

## 2018-06-19 NOTE — LETTER
June 12, 2019      Shonda Valdivia  [address]  [facility] [address]    Dear ,      At [facility], your health and wellness is our primary concern. That is why we are following up on a colposcopy from 6/29/18. Your provider had recommended that you have a Pap smear and HPV test completed by 6/29/19. Our records do not show that this has been scheduled.    It is important to complete the follow up that your provider has suggested for you to ensure that there are no worsening changes which may, over time, develop into cancer.      Please contact our office at  [phone] to schedule an appointment for a Pap smear and HPV test at your earliest convenience. If you have questions or concerns, please call the clinic and we will be happy to assist you.    If you have completed the tests outside of [facility], please have the results forwarded to our office. We will update the chart for your primary Physician to review before your next annual physical.     Thank you for choosing [facility]!    Sincerely,      Your [facility] Care Team/arian

## 2018-06-29 ENCOUNTER — OFFICE VISIT (OUTPATIENT)
Dept: OBGYN | Facility: CLINIC | Age: 28
End: 2018-06-29
Payer: COMMERCIAL

## 2018-06-29 VITALS
HEIGHT: 67 IN | WEIGHT: 162.6 LBS | DIASTOLIC BLOOD PRESSURE: 73 MMHG | TEMPERATURE: 98.4 F | BODY MASS INDEX: 25.52 KG/M2 | HEART RATE: 63 BPM | SYSTOLIC BLOOD PRESSURE: 133 MMHG | RESPIRATION RATE: 16 BRPM

## 2018-06-29 DIAGNOSIS — R87.810 ASCUS WITH POSITIVE HIGH RISK HPV CERVICAL: ICD-10-CM

## 2018-06-29 DIAGNOSIS — R87.610 ASCUS WITH POSITIVE HIGH RISK HPV CERVICAL: ICD-10-CM

## 2018-06-29 LAB — HCG UR QL: NEGATIVE

## 2018-06-29 PROCEDURE — 88305 TISSUE EXAM BY PATHOLOGIST: CPT | Performed by: OBSTETRICS & GYNECOLOGY

## 2018-06-29 PROCEDURE — 81025 URINE PREGNANCY TEST: CPT | Performed by: OBSTETRICS & GYNECOLOGY

## 2018-06-29 PROCEDURE — 57454 BX/CURETT OF CERVIX W/SCOPE: CPT | Performed by: OBSTETRICS & GYNECOLOGY

## 2018-06-29 NOTE — PROGRESS NOTES
Shonda presents for colposcopy, referred by Dr. Mark. Pap smear 4 weeks ago showed: ASCUS and HPV 16 positive.     PMH/PSH/Meds/Allergies reviewed & documented in [facility].    Procedure for colposcopy and biopsy has been explained to the   patient and consent obtained.    PROCEDURE:  Speculum placed in vagina and excellent visualization of cervix achieved, cervix swabbed x 3 with acetic acid solution.    FINDINGS:  Cervix: visible lesion(s) at 12, 10, and 6 o'clock, acetowhite lesion(s) noted at 12, 10 and 6 o'clock and mosaicism noted at 10 and 12 o'clock; SCJ visualized - lesion at 6, 10 and 12 o'clock, endocervical curettage performed, cervical biopsies taken at same o'clock, specimen labelled and sent to pathology and hemostasis achieved with silver nitrate.    Vaginal inspection: vaginal colposcopy not performed.    Vulvar colposcopy: vulvar colposcopy not performed.    Procedure Summary: Patient tolerated procedure well and colposcopy adequate.    Colposcopic Impression: rule out HGSIL    Plan: Specimens labelled and sent to pathology., Will base further treatment on pathology findings. and treatment options discussed with patient.    Aniya Fam MD

## 2018-06-29 NOTE — MR AVS SNAPSHOT
"              After Visit Summary   6/29/2018    Shonda Valdivia    MRN: [id number]           Patient Information     Date Of Birth          1990        Visit Information        Provider Department      6/29/2018 8:00 AM Aniya Fam MD; [facility]        Today's Diagnoses     ASCUS with positive high risk HPV cervical           Follow-ups after your visit        Who to contact     If you have questions or need follow up information about today's clinic visit or your schedule please contact [facility] directly at [phone].  Normal or non-critical lab and imaging results will be communicated to you by MyChart, letter or phone within 4 business days after the clinic has received the results. If you do not hear from us within 7 days, please contact the clinic through [employer] or phone. If you have a critical or abnormal lab result, we will notify you by phone as soon as possible.  Submit refill requests through [employer] or call your pharmacy and they will forward the refill request to us. Please allow 3 business days for your refill to be completed.          Additional Information About Your Visit        MyChart Information     [employer] gives you secure access to your electronic health record. If you see a primary care provider, you can also send messages to your care team and make appointments. If you have questions, please call your primary care clinic.  If you do not have a primary care provider, please call [phone] and they will assist you.        Care EveryWhere ID     This is your Care EveryWhere ID. This could be used by other organizations to access your [facility] medical records  [id number]        Your Vitals Were     Pulse Temperature Respirations Height Last Period BMI (Body Mass Index)    63 98.4  F (36.9  C) (Tympanic) 16 5' 7\" (1.702 m) 05/31/2018 25.47 kg/m2       Blood Pressure from Last 3 Encounters:   06/29/18 133/73 "   06/11/18 126/88   02/09/18 116/68    Weight from Last 3 Encounters:   06/29/18 162 lb 9.6 oz (73.8 kg)   06/11/18 164 lb (74.4 kg)   06/22/17 165 lb (74.8 kg)              We Performed the Following     Colposcopy cervix with cervix biopsy and ECC     HCG Qual, U[place] [facility] [place]  ([id number])     Surgical pathology exam        Primary Care Provider Office Phone # Fax #    Zulay Mark [phone] [address] [place] [place] [address]        Equal Access to Services     [facility]: Hadii aad ku hadasho Soomaali, waaxda luqadaha, qaybta kaalmada adeluisyada, lani alfred . So [facility] [phone].    ATENCIÓN: Si habla español, tiene a donis disposición servicios gratuitos de asistencia lingüística. Llame al [phone].    We comply with applicable federal civil rights laws and [place] laws. We do not discriminate on the basis of race, color, national origin, age, disability, sex, sexual orientation, or gender identity.            Thank you!     Thank you for choosing [facility]  for your care. Our goal is always to provide you with excellent care. Hearing back from our patients is one way we can continue to improve our services. Please take a few minutes to complete the written survey that you may receive in the mail after your visit with us. Thank you!             Your Updated Medication List - Protect others around you: Learn how to safely use, store and throw away your medicines at www.disposemymeds.org.      Notice  As of 6/29/2018 12:02 PM    You have not been prescribed any medications.

## 2018-07-02 LAB — COPATH REPORT: NORMAL

## 2018-07-02 NOTE — PROGRESS NOTES
Shonda  Your results are normal.  If you have any other questions or concerns, please followup in the office or contact us on mychart or evisit.    F/uy 1 year pap and HPV  Aniya Fam

## 2019-07-28 ASSESSMENT — ENCOUNTER SYMPTOMS
EYE PAIN: 0
PALPITATIONS: 0
DYSURIA: 0
ARTHRALGIAS: 0
DIZZINESS: 0
WEAKNESS: 0
FEVER: 0
SHORTNESS OF BREATH: 0
JOINT SWELLING: 0
CONSTIPATION: 0
COUGH: 0
HEADACHES: 0
HEMATOCHEZIA: 0
MYALGIAS: 0
HEMATURIA: 0
BREAST MASS: 0
SORE THROAT: 0
NERVOUS/ANXIOUS: 0
FREQUENCY: 0
CHILLS: 0
NAUSEA: 0
DIARRHEA: 0
PARESTHESIAS: 0
HEARTBURN: 0
ABDOMINAL PAIN: 0

## 2019-07-31 ASSESSMENT — ENCOUNTER SYMPTOMS
WEAKNESS: 0
CONSTIPATION: 0
SHORTNESS OF BREATH: 0
MYALGIAS: 0
PALPITATIONS: 0
ARTHRALGIAS: 0
NERVOUS/ANXIOUS: 0
EYE PAIN: 0
SORE THROAT: 0
BREAST MASS: 0
ABDOMINAL PAIN: 0
JOINT SWELLING: 0
DIARRHEA: 0
FEVER: 0
HEADACHES: 0
HEARTBURN: 0
PARESTHESIAS: 0
NAUSEA: 0
HEMATOCHEZIA: 0
DYSURIA: 0
FREQUENCY: 0
HEMATURIA: 0
CHILLS: 0
COUGH: 0
DIZZINESS: 0

## 2019-07-31 NOTE — PATIENT INSTRUCTIONS
Our Clinic hours are:  Mondays    7:20 am - 7 pm  Tues - Fri  7:20 am - 5 pm    Clinic Phone: [phone]    The clinic lab opens at 7:30 am Mon - Fri and appointments are required.    [facility]. [phone]  Monday  8 am - 7pm  Tues - Fri 8 am - 5:30 pm           Preventive Health Recommendations  Female Ages 26 - 39  Yearly exam:   See your health care provider every year in order to    Review health changes.     Discuss preventive care.      Review your medicines if you your doctor has prescribed any.    Until age 30: Get a Pap test every three years (more often if you have had an abnormal result).    After age 30: Talk to your doctor about whether you should have a Pap test every 3 years or have a Pap test with HPV screening every 5 years.   You do not need a Pap test if your uterus was removed (hysterectomy) and you have not had cancer.  You should be tested each year for STDs (sexually transmitted diseases), if you're at risk.   Talk to your provider about how often to have your cholesterol checked.  If you are at risk for diabetes, you should have a diabetes test (fasting glucose).  Shots: Get a flu shot each year. Get a tetanus shot every 10 years.   Nutrition:     Eat at least 5 servings of fruits and vegetables each day.    Eat whole-grain bread, whole-wheat pasta and brown rice instead of white grains and rice.    Get adequate Calcium and Vitamin D.     Lifestyle    Exercise at least 150 minutes a week (30 minutes a day, 5 days of the week). This will help you control your weight and prevent disease.    Limit alcohol to one drink per day.    No smoking.     Wear sunscreen to prevent skin cancer.    See your dentist every six months for an exam and cleaning.

## 2019-07-31 NOTE — PROGRESS NOTES
SUBJECTIVE:   CC: Shonda Valdivia is an 29 year old woman who presents for preventive health visit.     Healthy Habits:     Getting at least 3 servings of Calcium per day:  Yes    Bi-annual eye exam:  NO    Dental care twice a year:  Yes    Sleep apnea or symptoms of sleep apnea:  None    Diet:  Regular (no restrictions)    Frequency of exercise:  None    Taking medications regularly:  Not Applicable    Medication side effects:  Not applicable    PHQ-2 Total Score: 0    Additional concerns today:  No          Patient Active Problem List    Diagnosis Date Noted     ASCUS with positive high risk HPV cervical 06/11/2018     Priority: Medium     6/11/2018 Pap: ASCUS, +HR HPV type 16 (not 18 or other). Plan [place]-  6/29/18 [place] Bx's & ECC - Negative. Plan cotest in 1 year.        Contraception 09/07/2014     Priority: Medium     Implanon May 2011 - January 2012  Has used OCPs  Nuva Ring started October 2013       CARDIOVASCULAR SCREENING; LDL GOAL LESS THAN 160 12/04/2012     Priority: Medium     Dyspareunia 10/18/2011     Priority: Medium     Vaginal itching 10/18/2011     Priority: Medium     Other ankle sprain and strain 08/21/2007     Priority: Medium         Today's PHQ-2 Score:   PHQ-2 ( 1999 [employer]) 7/28/2019   Q1: Little interest or pleasure in doing things 0   Q2: Feeling down, depressed or hopeless 0   PHQ-2 Score 0   Q1: Little interest or pleasure in doing things Not at all   Q2: Feeling down, depressed or hopeless Not at all   PHQ-2 Score 0       Abuse: Current or Past(Physical, Sexual or Emotional)- No  Do you feel safe in your environment? Yes    Social History     Tobacco Use     Smoking status: Never Smoker     Smokeless tobacco: Never Used   Substance Use Topics     Alcohol use: Yes     Comment: occ     If you drink alcohol do you typically have >3 drinks per day or >7 drinks per week? No    No flowsheet data found.    Reviewed orders with patient.  Reviewed health maintenance and updated orders  "accordingly - Yes  Lab work is in process    Mammogram not appropriate for this patient based on age.    Pertinent mammograms are reviewed under the imaging tab.  History of abnormal Pap smear: YES - updated in Problem List and Health Maintenance accordingly  PAP / HPV Latest Ref Rng & Units 6/11/2018 3/13/2015   PAP - ASC-US(A) NIL   HPV 16 DNA NEG:Negative Positive(A) -   HPV 18 DNA NEG:Negative Negative -   OTHER HR HPV NEG:Negative Negative -     Reviewed and updated as needed this visit by clinical staff  Tobacco  Allergies  Meds  Problems  Med Hx  Surg Hx  Fam Hx         Reviewed and updated as needed this visit by Provider  Tobacco  Allergies  Meds  Problems  Med Hx  Surg Hx  Fam Hx            Review of Systems   Constitutional: Negative for chills and fever.   HENT: Negative for congestion, ear pain, hearing loss and sore throat.    Eyes: Negative for pain and visual disturbance.   Respiratory: Negative for cough and shortness of breath.    Cardiovascular: Negative for chest pain, palpitations and peripheral edema.   Gastrointestinal: Negative for abdominal pain, constipation, diarrhea, heartburn, hematochezia and nausea.   Breasts:  Negative for tenderness, breast mass and discharge.   Genitourinary: Negative for dysuria, frequency, genital sores, hematuria, pelvic pain, urgency, vaginal bleeding and vaginal discharge.   Musculoskeletal: Negative for arthralgias, joint swelling and myalgias.   Skin: Negative for rash.   Neurological: Negative for dizziness, weakness, headaches and paresthesias.   Psychiatric/Behavioral: Negative for mood changes. The patient is not nervous/anxious.           OBJECTIVE:   /74   Pulse 63   Temp 98.9  F (37.2  C) (Tympanic)   Resp 18   Ht 1.702 m (5' 7\")   Wt 78.9 kg (174 lb)   LMP 07/06/2019   SpO2 98%   BMI 27.25 kg/m    Physical Exam  GENERAL: healthy, alert and no distress  EYES: Eyes grossly normal to inspection, PERRL and conjunctivae and " "sclerae normal  HENT: ear canals and TM's normal, nose and mouth without ulcers or lesions  NECK: no adenopathy, no asymmetry, masses, or scars and thyroid normal to palpation  RESP: lungs clear to auscultation - no rales, rhonchi or wheezes  BREAST: normal without masses, tenderness or nipple discharge and no palpable axillary masses or adenopathy  CV: regular rate and rhythm, normal S1 S2, no S3 or S4, no murmur, click or rub, no peripheral edema and peripheral pulses strong  ABDOMEN: soft, nontender, no hepatosplenomegaly, no masses and bowel sounds normal   (female): normal female external genitalia, normal urethral meatus , vaginal mucosa pink, moist, well rugated, normal cervix, adnexae, and uterus without masses. and pap obtained  MS: no gross musculoskeletal defects noted, no edema  SKIN: no suspicious lesions or rashes  NEURO: Normal strength and tone, mentation intact and speech normal  PSYCH: mentation appears normal, affect normal/bright    Diagnostic Test Results:  Labs reviewed in Epic    ASSESSMENT/PLAN:   1. Routine general medical examination at a health care facility       2. ASCUS with positive high risk HPV cervical     - Pap imaged thin layer screen with HPV - recommended age 30 - 65 years (select HPV order below)  - HPV High Risk Types DNA Cervical    COUNSELING:  Reviewed preventive health counseling, as reflected in patient instructions       Regular exercise       Healthy diet/nutrition    Estimated body mass index is 27.25 kg/m  as calculated from the following:    Height as of this encounter: 1.702 m (5' 7\").    Weight as of this encounter: 78.9 kg (174 lb).         reports that she has never smoked. She has never used smokeless tobacco.      Counseling Resources:  ATP IV Guidelines  Pooled Cohorts Equation Calculator  Breast Cancer Risk Calculator  FRAX Risk Assessment  ICSI Preventive Guidelines  Dietary Guidelines for Americans, 2010  USDA's MyPlate  ASA Prophylaxis  Lung CA " Screening    Zulay Mark MD  [facility]

## 2019-08-02 ENCOUNTER — OFFICE VISIT (OUTPATIENT)
Dept: FAMILY MEDICINE | Facility: CLINIC | Age: 29
End: 2019-08-02
Payer: COMMERCIAL

## 2019-08-02 VITALS
SYSTOLIC BLOOD PRESSURE: 124 MMHG | DIASTOLIC BLOOD PRESSURE: 74 MMHG | BODY MASS INDEX: 27.31 KG/M2 | HEART RATE: 63 BPM | HEIGHT: 67 IN | TEMPERATURE: 98.9 F | RESPIRATION RATE: 18 BRPM | OXYGEN SATURATION: 98 % | WEIGHT: 174 LBS

## 2019-08-02 DIAGNOSIS — R87.610 ASCUS WITH POSITIVE HIGH RISK HPV CERVICAL: ICD-10-CM

## 2019-08-02 DIAGNOSIS — R87.810 ASCUS WITH POSITIVE HIGH RISK HPV CERVICAL: ICD-10-CM

## 2019-08-02 DIAGNOSIS — Z00.00 ROUTINE GENERAL MEDICAL EXAMINATION AT A HEALTH CARE FACILITY: Primary | ICD-10-CM

## 2019-08-02 PROCEDURE — G0124 SCREEN C/V THIN LAYER BY MD: HCPCS | Performed by: FAMILY MEDICINE

## 2019-08-02 PROCEDURE — 99395 PREV VISIT EST AGE 18-39: CPT | Performed by: FAMILY MEDICINE

## 2019-08-02 PROCEDURE — 87624 HPV HI-RISK TYP POOLED RSLT: CPT | Performed by: FAMILY MEDICINE

## 2019-08-02 PROCEDURE — G0145 SCR C/V CYTO,THINLAYER,RESCR: HCPCS | Performed by: FAMILY MEDICINE

## 2019-08-02 ASSESSMENT — PAIN SCALES - GENERAL: PAINLEVEL: NO PAIN (0)

## 2019-08-02 ASSESSMENT — PATIENT HEALTH QUESTIONNAIRE - PHQ9: SUM OF ALL RESPONSES TO PHQ QUESTIONS 1-9: 0

## 2019-08-02 ASSESSMENT — MIFFLIN-ST. JEOR: SCORE: 1551.89

## 2019-08-09 LAB
COPATH REPORT: ABNORMAL
PAP: ABNORMAL

## 2019-08-12 PROBLEM — R87.619 ATYPICAL ENDOCERVICAL CELLS ON PAP SMEAR: Status: ACTIVE | Noted: 2018-06-11

## 2019-08-12 PROBLEM — R87.810 CERVICAL HIGH RISK HPV (HUMAN PAPILLOMAVIRUS) TEST POSITIVE: Status: ACTIVE | Noted: 2019-08-12

## 2019-09-20 ENCOUNTER — OFFICE VISIT (OUTPATIENT)
Dept: OBGYN | Facility: CLINIC | Age: 29
End: 2019-09-20
Payer: COMMERCIAL

## 2019-09-20 VITALS
WEIGHT: 175 LBS | HEART RATE: 70 BPM | TEMPERATURE: 98 F | SYSTOLIC BLOOD PRESSURE: 138 MMHG | BODY MASS INDEX: 27.41 KG/M2 | DIASTOLIC BLOOD PRESSURE: 87 MMHG

## 2019-09-20 DIAGNOSIS — Z01.812 PRE-PROCEDURE LAB EXAM: ICD-10-CM

## 2019-09-20 DIAGNOSIS — R87.619 ATYPICAL CERVICAL GLANDULAR CELLS: Primary | ICD-10-CM

## 2019-09-20 DIAGNOSIS — R87.810 CERVICAL HIGH RISK HPV (HUMAN PAPILLOMAVIRUS) TEST POSITIVE: ICD-10-CM

## 2019-09-20 LAB — HCG UR QL: NEGATIVE

## 2019-09-20 PROCEDURE — 81025 URINE PREGNANCY TEST: CPT | Performed by: OBSTETRICS & GYNECOLOGY

## 2019-09-20 PROCEDURE — 88313 SPECIAL STAINS GROUP 2: CPT | Performed by: OBSTETRICS & GYNECOLOGY

## 2019-09-20 PROCEDURE — 58110 BX DONE W/COLPOSCOPY ADD-ON: CPT | Performed by: OBSTETRICS & GYNECOLOGY

## 2019-09-20 PROCEDURE — 57454 BX/CURETT OF CERVIX W/SCOPE: CPT | Performed by: OBSTETRICS & GYNECOLOGY

## 2019-09-20 PROCEDURE — 88341 IMHCHEM/IMCYTCHM EA ADD ANTB: CPT | Performed by: OBSTETRICS & GYNECOLOGY

## 2019-09-20 PROCEDURE — 88305 TISSUE EXAM BY PATHOLOGIST: CPT | Performed by: OBSTETRICS & GYNECOLOGY

## 2019-09-20 PROCEDURE — 88342 IMHCHEM/IMCYTCHM 1ST ANTB: CPT | Performed by: OBSTETRICS & GYNECOLOGY

## 2019-09-20 NOTE — PROGRESS NOTES
Shonda Valdivia is a 29 year old female G0 who presents for abnormal pap smear evaluation referred by uZlay Mark MD. Has no complaints.     On questioning, does acknowledge that in the last 6 months her period intervals have been not normal. For the most part, they have occurred monthly, but does admit to having skipped a period and returned the following month. Also, her most recent period arrived about a week late.     Pap smear hx is as follows:   6/11/2018 Pap: ASCUS, +HR HPV type 16 (not 18 or other). Plan [place]-  6/29/18 [place] Bx's & ECC - Negative. Plan cotest in 1 year.   8/2/19 [facility] pap - atypical endocervical cells, + HR HPV 16 (Neg 18 & other). Plan colp with ECC.     This will be her repeat colposcopy.    No LMP recorded.   UPT today is negative      Patient does not smoke    Type of contraception: none  Age at first sexual intercourse: 17  Number of sexual partners (lifetime): 8  Past GYN history:  Chlamydia  Prior cervical/vaginal disease: HPV related changes.  Prior cervical treatment: no treatment.      PROCEDURE:  Before the procedure, it was ensured that the patient was educated regarding the nature of her findings to date, the implications, and what was to be done. She has been made aware of the role of HPV, the natural history of infection, ways to minimize her future risk, the effect of HPV on the cervix, and treatment options available should they be indicated.  The details of the colposcopic procedure were reviewed.  All questions were answered before proceeding, and informed consent was therefore obtained.    Speculum placed in vagina and excellent visualization of cervix   acheived, cervix swabbed x 3 with acetic acid solution.    FINDINGS:  EXAM:  not currently breastfeeding.  BMI= There is no height or weight on file to calculate BMI.  No LMP recorded.  General - pleasant female in no acute distress.  Neurological - alert and oriented X 3  Psychiatric - normal mood and  affect    Pelvic-  Cervix: acetowhitening noted 7:00, 10:00 and 12:00; biopsies taken (3)  Please refer to images section for details.    Pap repeated?:  No  SCJ seen?:  yes    ECC done?:  Yes   Lugol's solution used?:  Yes   Satisfactory examination?:  yes    Procedure: Endometrial biopsy  After informed consent was obtained from the patient, a speculum was placed in the vagina to visualize the cervix.  The cervix was then swabbed with a betadine prep.  Tenaculum was applied to the anterior cervical lip. Endometrial biopsy pipelle was passed through the cervical os and tissue obtained.  Uterus sounded to 8 cm.  Tenaculum was removed and sites were hemostatic. There were no complications. The patient tolerated the procedure well with a minimal amount of cramping noted.  Specimen was sent to pathology.    ASSESSMENT: Atypical glandular cells and cervical high risk HPV type 16    PLAN: specimens labelled and sent to Pathology, will base further treatment on Pathology findings, post biopsy instructions given to patient and  Repeat pap smear in 4-6 months    A copy of the chart will be sent to the referring provider.    Jen Elder MD  [facility]

## 2019-09-20 NOTE — NURSING NOTE
"Initial /87 (BP Location: Left arm, Patient Position: Chair, Cuff Size: Adult Regular)   Pulse 70   Temp 98  F (36.7  C) (Tympanic)   Wt 79.4 kg (175 lb)   LMP 09/12/2019 (Exact Date)   Breastfeeding? No   BMI 27.41 kg/m   Estimated body mass index is 27.41 kg/m  as calculated from the following:    Height as of 8/2/19: 1.702 m (5' 7\").    Weight as of this encounter: 79.4 kg (175 lb). .    Shaina Rees MA    "

## 2019-09-26 ENCOUNTER — MYC MEDICAL ADVICE (OUTPATIENT)
Dept: OBGYN | Facility: CLINIC | Age: 29
End: 2019-09-26

## 2019-10-01 ENCOUNTER — HOSPITAL PATHOLOGY (OUTPATIENT)
Dept: OTHER | Facility: CLINIC | Age: 29
End: 2019-10-01

## 2019-10-01 LAB
COPATH REPORT: NORMAL
COPATH REPORT: NORMAL

## 2019-10-02 ENCOUNTER — NURSE TRIAGE (OUTPATIENT)
Dept: NURSING | Facility: CLINIC | Age: 29
End: 2019-10-02

## 2019-10-02 NOTE — TELEPHONE ENCOUNTER
Reason for Disposition    [1] Caller requesting NON-URGENT health information AND [2] PCP's office is the best resource    Protocols used: INFORMATION ONLY CALL-A-AH

## 2019-10-03 NOTE — RESULT ENCOUNTER NOTE
Inform patient that her colposcopy returned with an atypical abnormality that we do not usually encounter with colposcopies.   It is called stratified mucin-producing intraepithelial lesion. Its a type of pre-cancerous lesion that acts differently than the typical pre-cancerous lesions that we encounter with colposcopies. This abnormality was seen on the surface of her cervix. The other biopsies taken involving her cervical canal and endometrial lining returned benign.   Therefore, the recommendation is to proceed with a cold knife cone procedure which is a procedure that would need to be done in the operating room. This is the preferred procedure given that it still would keep her fertility intact. Otherwise, the other option would be a hysterectomy, which would only be for women who are done with child bearing needs.   She can be instructed to call the clinic to get this scheduled in the near future. This message has also been sent to my surgery schedulers to anticipate a call from the patient to have this scheduled.     Jen Elder MD  [facility]

## 2019-10-03 NOTE — TELEPHONE ENCOUNTER
Reason for Call:  Other call back    Detailed comments: Pt calling to schedule OR procedure that was recommended for her.  Would like to schedule for 10-17-19.  Will send to Dr. Li for surgery orders.  Does pt need a pre-op?    Phone Number Patient can be reached at: Home number on file [phone] (home)    Best Time: any    Can we leave a detailed message on this number? YES    Call taken on 10/3/2019 at 1:04 PM by Fiona Parada

## 2019-10-04 ENCOUNTER — HOSPITAL ENCOUNTER (OUTPATIENT)
Facility: CLINIC | Age: 29
End: 2019-10-04
Attending: OBSTETRICS & GYNECOLOGY | Admitting: OBSTETRICS & GYNECOLOGY
Payer: COMMERCIAL

## 2019-10-04 ENCOUNTER — PREP FOR PROCEDURE (OUTPATIENT)
Dept: OBGYN | Facility: CLINIC | Age: 29
End: 2019-10-04

## 2019-10-04 DIAGNOSIS — N87.9 CERVIX DYSPLASIA: Primary | ICD-10-CM

## 2019-10-04 DIAGNOSIS — N87.9 CERVIX DYSPLASIA: ICD-10-CM

## 2019-10-04 NOTE — TELEPHONE ENCOUNTER
"  You are now scheduled for surgery at [facility].  Below are the details for your surgery.  Please read the \"Preparing for Your Surgery\" instructions and let us know if you have any questions.    Type of surgery: CONE BIOPSY, CERVIX  Surgeon:  Jen Elder MD  Location of surgery: [facility] OR    Date of surgery: 10-17-19    Time: 9:00am   Arrival Time: 8:00am    Time can change, to be confirmed a couple of days prior by pre-op surgery nurse.    Pre-Op Appt Date: Patient to schedule with a PCP or Family Practice Provider within 30 days to the surgery.  Post-Op Appt Date: To be determined by provider     Packet sent out: Yes  Pre-cert/Authorization completed:  TBD by Financial Securing Office.   MA Sterilization/Hysterectomy Acknowledgment Consent signed: Not Applicable    [facility]  [phone]    Fax: [phone]  Same Day Surgery [phone]  Fax: [phone]    "

## 2019-10-05 ENCOUNTER — MYC MEDICAL ADVICE (OUTPATIENT)
Dept: OBGYN | Facility: CLINIC | Age: 29
End: 2019-10-05

## 2019-10-08 ENCOUNTER — TELEPHONE (OUTPATIENT)
Dept: OBGYN | Facility: CLINIC | Age: 29
End: 2019-10-08

## 2019-10-08 ENCOUNTER — OFFICE VISIT (OUTPATIENT)
Dept: OBGYN | Facility: CLINIC | Age: 29
End: 2019-10-08
Payer: COMMERCIAL

## 2019-10-08 VITALS
BODY MASS INDEX: 27.26 KG/M2 | HEIGHT: 67 IN | TEMPERATURE: 98.6 F | DIASTOLIC BLOOD PRESSURE: 76 MMHG | RESPIRATION RATE: 16 BRPM | WEIGHT: 173.7 LBS | HEART RATE: 74 BPM | SYSTOLIC BLOOD PRESSURE: 118 MMHG

## 2019-10-08 DIAGNOSIS — N87.9 CERVIX DYSPLASIA: Primary | ICD-10-CM

## 2019-10-08 PROCEDURE — 99214 OFFICE O/P EST MOD 30 MIN: CPT | Performed by: OBSTETRICS & GYNECOLOGY

## 2019-10-08 ASSESSMENT — MIFFLIN-ST. JEOR: SCORE: 1550.53

## 2019-10-08 NOTE — TELEPHONE ENCOUNTER
Per Dr. Li surgery has been cancelled.  Pt is going to have surgery at the [facility] [place]

## 2019-10-08 NOTE — NURSING NOTE
"Initial /76 (BP Location: Right arm, Patient Position: Sitting, Cuff Size: Adult Regular)   Pulse 74   Temp 98.6  F (37  C) (Tympanic)   Resp 16   Ht 1.702 m (5' 7\")   Wt 78.8 kg (173 lb 11.2 oz)   LMP 09/12/2019 (Exact Date)   Breastfeeding? No   BMI 27.21 kg/m   Estimated body mass index is 27.21 kg/m  as calculated from the following:    Height as of this encounter: 1.702 m (5' 7\").    Weight as of this encounter: 78.8 kg (173 lb 11.2 oz). .    Ranjana Peña CMA    "

## 2019-10-08 NOTE — TELEPHONE ENCOUNTER
Pt notified of below.  Pt reports understanding.  Pt does not have further questions or concerns.    Collette Art   [facility] Clinic  RN

## 2019-10-08 NOTE — PROGRESS NOTES
"**  Forwarding chart as FYI to Gynecology Oncology team for ongoing care **    Patient previously followed by [facility], now being followed by Gyn Onc.  Last visit with Gyn Onc: NONE  Future visit scheduled: 10/11/19      [facility] Pap tracking will be closed for this pt once this message appears \"viewed\" by the Gyn Onc team.    Thank you,  Ranjana Richardson RN  Pap Tracking       "

## 2019-10-08 NOTE — TELEPHONE ENCOUNTER
ONCOLOGY INTAKE: Records Information      APPT INFORMATION:  Referring provider:  Dr. Elder  Referring provider s clinic:  [facility]  Reason for visit/diagnosis:  cervical dysplasia  Has patient been notified of appointment date and time?: yes    RECORDS INFORMATION:  Were the records received with the referral (via [employer])? No, internal referral

## 2019-10-08 NOTE — TELEPHONE ENCOUNTER
Appointment needed for patient at GYN/ONC per referral by Dr. Elder.    Appointment scheduled for Friday 10/11/19 0810 with Dr. Srivastava.    Patient will receive information through [employer].    Called patient to notify of appointment.  Unable to reach.  Left message for patient to return call to clinic.    Collette Art   [facility]yn Clinic  RN

## 2019-10-08 NOTE — PROGRESS NOTES
Ms. Shonda Valdivia 29 year old G0 returns to discuss recent colposcopy results.   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   The endocervical curetting and endometrial biopsy were benign.   Her Pap smear preceding her colposcopy was remarkable for atypical glandular cervical cells.     Today, she presents to discuss treatment option. It was recommended that she undergo a cold knife cone biopsy to be performed in the OR.   She has no complaints at this time today.     Past Medical History:   Diagnosis Date     Abnormal Pap smear of cervix 06/11/2018, 2019    see Problem List     Cervical high risk HPV (human papillomavirus) test positive 06/11/2018, 2019    See Problem List     Chlamydia 2009     URIEL III with severe dysplasia 09/20/2019    see problem list     Implantable subdermal contraceptive surveillance 9/7/2014    Placed at [facility] 5- -  Removed January 2012       Past Surgical History:   Procedure Laterality Date     APPENDECTOMY  ?2005     Social History     Tobacco Use     Smoking status: Never Smoker     Smokeless tobacco: Never Used   Substance Use Topics     Alcohol use: Yes     Comment: occ     Drug use: No     No family history on file.    C: NEGATIVE for fever, chills, change in weight  HEENT: NEGATIVE for visual changes, runny nose, epistaxis, ear pain, tinnitus, tooth ache, sore throat, difficulty with swallowing, sinus pain  R: NEGATIVE for significant cough or SOB  CV: NEGATIVE for chest pain, palpitations or peripheral edema  BREAST: NEGATIVE For soreness, pain, lumps or nipple discharge  GI: NEGATIVE for nausea, abdominal pain, heartburn, or change in bowel habits  : NEGATIVE for frequency, dysuria, hematuria, vaginal discharge, or irregular bleeding  Neuro: NEGATIVE for numbness, tingling, focal weakness, or headache  INT: NEGATIVE for rashes, lesions or  "pruritis   PSYCH: NEGATIVE for anxiety, depression, or halluciinations    Exam:   /76 (BP Location: Right arm, Patient Position: Sitting, Cuff Size: Adult Regular)   Pulse 74   Temp 98.6  F (37  C) (Tympanic)   Resp 16   Ht 1.702 m (5' 7\")   Wt 78.8 kg (173 lb 11.2 oz)   LMP 09/12/2019 (Exact Date)   Breastfeeding? No   BMI 27.21 kg/m    General Appearance: Well nourished, well developed female, NAD, AOx3  Neurological: Mental Status Normal and Station and Gait Normal   Skin: Normal skin turgor  HEET: Atraumatic, normocephalic, EOMI  Neck: Supple,no adenopathy,thyroid normal  Lungs: Good respiratory effort  Abdomen: Soft, NT, ND, no masses  Pelvic: Normal external female genitalia.  No external lesions, normal hair distribution, no adenopathy. Speculum exam reveals vaginal epithelium well rugated with no abnormal discharge. Cervix appears smooth, pink, with no visible lesions. Bimanual exam deferred.  Extremities: No clubbing, no cyanosis and no edema    Pathology:   A.  Cervix, biopsy:   Cervix from transformation zone with:   - Focal HSIL (high grade squamous intraepithelial lesion, URIEL 2-3 with   focal mucinous, consistent with   stratified mucin-producing intraepithelial lesion (SMILE).   - Normal mature squamous epithelium.   - Normal endocervical glandular epithelium.   B.  Endocervix, curettings:   - Multiple fragments of normal endocervical tissue.   - No squamous epithelium identified.   C.  Endometrium, biopsy:   - Multiple fragments of benign proliferative endometrium, negative for   hyperplasia, atypia and malignancy.   COMMENT:   Pap smear [id number] was diagnosed as atypical endocervical cells.  On   review of the pap smear, these atypical   cells correlate with the HSIL cells see in the lesion in [id number].   Intradepartmental consultation is obtained on specimens A and C.  Specimen    A is sent to [facility] pathology for consultation on 9/30/2019 and Dr."   Mat Darnell concurs with the   diagnosis.  See separate consultation report, [id number].   I have reviewed this specimen and edited this report   Electronically signed out by: Saul Leigh M.D.     A/P Cervical dysplasia  -- discussed colposcopy results in particular the diagnosis of SMILE   -- discussed significance of SMILE in terms of doing a cold knife cone (given her desire to keep child bearing needs)  vs a LEEP to ensure adequate evaluation of the margins that not affected by cautery as well as the  benefit of a obtaining a deeper specimen   -- counseled on pregnancy implications related to a cold knife cone which can put her slightly at risk for cervical insufficiency/ labor  -- in the end, recommended that patient seek Gyn Oncology referral for further counseling and cold knife cone to be done with them    Total time spent was 25 minutes; greater than 50% of time was spent in counseling and/or coordination of care for the above listed diagnoses, not including time spent on the procedure.    Jen Elder MD  [facility]

## 2019-10-10 NOTE — PROGRESS NOTES
GYN Oncology New Patient Consult    Referring provider:    Jen Elder MD  [address] [place] [address]   RE: Shonda Valdivia  : 1990  MOMO: October 10, 2019      CC: Stratified mucin-producing intraepithelial lesion of the cervix    HPI: Ms Shonda Valdivia is a 29 year old year old G0 female who presents for consultation regarding recent abnormal pap followed by colposcopy demonstrating focal HSIL and stratified mucin producing intraepithelial lesion of the cervix in the setting of HPV 16+. She is here today with her mother and sister to discuss treatment options.      She notes that she desires fertility in the future. She is not currently taking any OCP's and uses condoms as her primary method of birth control. Denies pelvic pain, cramping, abnormal bleeding, and discharge.     Treatment History:   2018 Pap: ASCUS, +HR HPV type 16 (not 18 or other). Plan Colpo  18 [place] Bx's & ECC - Negative. Plan cotest in 1 year.   19 ATYP pap - atypical glandular/endocervical cells, + HR HPV 16 (Neg 18 & other)  19:  Colpo. ECC negative. Endometrial biopsy negative. Cervical biopsy with HGSIL/SMILE. Surg path consult at [facility] confirms SMILE    Review of Systems:  Systemic:No weight changes.    Skin : No skin changes or new lesions.   Eye : No changes in vision.   Pulmonary: No cough or SOB.   Cardiovascular: No CP or palpitations  Gastrointestinal : No diarrhea, constipation, abdominal pain. Bowel habits normal.   Genitourinary: No dysuria, urgency or bleeding  Psychiatric: No depression or anxiety  Hematologic : No palpable lymph nodes.   Endocrine : No hot flashes. No heat/cold intolerance.      Neurological: No headaches, no numbness.     Past Medical History:   Diagnosis Date     Abnormal Pap smear of cervix 2018,     see Problem List     Cervical high risk HPV (human papillomavirus) test positive 2018,     See Problem List     Chlamydia 2009     URIEL  "III with severe dysplasia 2019    see problem list     Implantable subdermal contraceptive surveillance 2014    Placed at [facility] 2011 -  Removed 2012         Past Surgical History:   Procedure Laterality Date     APPENDECTOMY  ?        OBGYN history and Health Maintenance:    Last Pap Smear:   Per HPI  Last Mammogram:never  Last Colonoscopy: never    No current outpatient medications on file.            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 No history of cancer in first degree relatives.        Physical Exam:     /83 (BP Location: Right arm, Patient Position: Chair, Cuff Size: Adult Regular)   Pulse 66   Temp 98.3  F (36.8  C) (Oral)   Ht 1.686 m (5' 6.38\")   Wt 78 kg (171 lb 14.4 oz)   LMP 2019 (Exact Date)   SpO2 99%   BMI 27.43 kg/m    Body mass index is 27.43 kg/m .    General: Alert and oriented, no acute distress  Psych: Mood stable. Linear speech, appropriate affect  Cardiovascular: RRR, no murmors  Pulmonary: Lungs clear . Normal respiratory effort  GI: No distention. No masses. No hernia.   Lymph: No enlarge lymph nodes in neck or groin  : Normal external genitalia. Cervix: no visible lesions present, minimal friable tissue at biopsy sites.Colpo not performed. BME without masses    Patient Name: HEIDE HOWARD   MR#: -[id number]   Specimen #: [id number]   Collected: 10/1/2019   Received: 10/1/2019   Reported: 10/1/2019 12:47   Ordering Phy(s): OMAR FREIRE "     For improved result formatting, select 'View Enhanced Report Format' under    Linked Documents section.     TEST(S):   8 slides, case #[id number]     FINAL DIAGNOSIS:   CASE FROM [facility], [place], [place] ([id number], OBTAINED 2019):   A. CERVIX, BIOPSY:   - Stratified mucin-producing intraepithelial lesion (SMILE)     COMMENT:   There is a focus of high grade squamous intraepithelial lesion involving   multilayered cells with   intracytoplasmic mucin (Am J Surg Pathol 2000;24:1414-9).     Immunohistochemical stains for p16 and Ki-67 as   well as mucicarmine stain were prepared and the results support the given   diagnosis.     I have personally reviewed all specimens and/or slides, including the   listed special stains, and used them   with my medical judgement to determine or confirm the final diagnosis.     Electronically signed out by:     Mat Darnell M.D., PhD, Physicians       Assessment:    Shonda Valdivia is a 29 year old woman with a new diagnosis of cervical dysplasia, SMILE.       Plan:     1.)   SMILE of cervix:  LImited data, but this likely behaves like AIS and is an HPV related lesions. Recommend cervical conization. Discussed surgical risks not limited to bleeding, infection, damage to surrounding organs, need for blood transfusions. Consents reviewed and signed today. Discussed implications for future pregnancies (increase risk of  delivery and possible fertility effects). Also recommend waiting ~1 year before attempting pregnancy. Discussed birth control options.       -cervical conization.   -Surveillance post procedure per ASCCP guidelines     References:    PMID: [id number]   PMID: [id number]    2.) Genetic risk factors were assessed: No need for genetic testing or further evaluation at this time.         A total of 60 minutes was spent with the patient, 55 minutes of which were spent in counseling the patient and/or treatment  planning.    Korin Srivastava MD    Department of Ob/Gyn and Women's Health  Division of Gynecologic Oncology  [facility]  [phone]    PMID: [id number]

## 2019-10-11 ENCOUNTER — ONCOLOGY VISIT (OUTPATIENT)
Dept: ONCOLOGY | Facility: CLINIC | Age: 29
End: 2019-10-11
Attending: OBSTETRICS & GYNECOLOGY
Payer: COMMERCIAL

## 2019-10-11 ENCOUNTER — PRE VISIT (OUTPATIENT)
Dept: ONCOLOGY | Facility: CLINIC | Age: 29
End: 2019-10-11

## 2019-10-11 ENCOUNTER — TELEPHONE (OUTPATIENT)
Dept: ONCOLOGY | Facility: CLINIC | Age: 29
End: 2019-10-11

## 2019-10-11 VITALS
HEIGHT: 66 IN | SYSTOLIC BLOOD PRESSURE: 125 MMHG | TEMPERATURE: 98.3 F | DIASTOLIC BLOOD PRESSURE: 83 MMHG | WEIGHT: 171.9 LBS | HEART RATE: 66 BPM | OXYGEN SATURATION: 99 % | BODY MASS INDEX: 27.63 KG/M2

## 2019-10-11 DIAGNOSIS — N87.9 CERVIX DYSPLASIA: ICD-10-CM

## 2019-10-11 PROCEDURE — G0463 HOSPITAL OUTPT CLINIC VISIT: HCPCS | Mod: ZF

## 2019-10-11 PROCEDURE — 99215 OFFICE O/P EST HI 40 MIN: CPT | Mod: ZP | Performed by: OBSTETRICS & GYNECOLOGY

## 2019-10-11 RX ORDER — CEFAZOLIN SODIUM 1 G/50ML
1 INJECTION, SOLUTION INTRAVENOUS SEE ADMIN INSTRUCTIONS
Status: CANCELLED | OUTPATIENT
Start: 2019-10-11

## 2019-10-11 RX ORDER — CEFAZOLIN SODIUM 2 G/50ML
2 SOLUTION INTRAVENOUS
Status: CANCELLED | OUTPATIENT
Start: 2019-10-11

## 2019-10-11 ASSESSMENT — MIFFLIN-ST. JEOR: SCORE: 1532.48

## 2019-10-11 ASSESSMENT — PAIN SCALES - GENERAL: PAINLEVEL: NO PAIN (0)

## 2019-10-11 NOTE — NURSING NOTE
Patient's rooming completed by Peg Foster, Student MA.      All steps completed per rooming protocol.    Terese Brock CMA ([facility])

## 2019-10-11 NOTE — NURSING NOTE
"Oncology Rooming Note    October 11, 2019 7:44 AM   Shonda Valdivia is a 29 year old female who presents for:    Chief Complaint   Patient presents with     Oncology Clinic Visit     New; Cervix Dysplasia     Initial Vitals: /83 (BP Location: Right arm, Patient Position: Chair, Cuff Size: Adult Regular)   Pulse 66   Temp 98.3  F (36.8  C) (Oral)   Ht 1.686 m (5' 6.38\")   Wt 78 kg (171 lb 14.4 oz)   LMP 09/12/2019 (Exact Date)   SpO2 99%   BMI 27.43 kg/m   Estimated body mass index is 27.43 kg/m  as calculated from the following:    Height as of this encounter: 1.686 m (5' 6.38\").    Weight as of this encounter: 78 kg (171 lb 14.4 oz). Body surface area is 1.91 meters squared.  No Pain (0) Comment: Data Unavailable   Patient's last menstrual period was 09/12/2019 (exact date).  Allergies reviewed: Yes  Medications reviewed: Yes    Medications: Medication refills not needed today.  Pharmacy name entered into [facility]: [facility] - [place], [place] - [address]    Clinical concerns: New to talk to provider. Dr. Srivastava was notified.      SMA Elisabeth              "

## 2019-10-11 NOTE — LETTER
10/11/2019       RE: Shonda Valdivia  [address]   [facility] [address]     Dear Colleague,    Thank you for referring your patient, Shonda Valdivia, to the [facility]. Please see a copy of my visit note below.    GYN Oncology New Patient Consult    Referring provider:    Jen Elder MD  [address] [place] [address]   RE: Shonad Valdivia  : 1990  MOMO: October 10, 2019      CC: Stratified mucin-producing intraepithelial lesion of the cervix    HPI: Ms Shonda Valdivia is a 29 year old year old G0 female who presents for consultation regarding recent abnormal pap followed by colposcopy demonstrating focal HSIL and stratified mucin producing intraepithelial lesion of the cervix in the setting of HPV 16+. She is here today with her mother and sister to discuss treatment options.      She notes that she desires fertility in the future. She is not currently taking any OCP's and uses condoms as her primary method of birth control. Denies pelvic pain, cramping, abnormal bleeding, and discharge.     Treatment History:   2018 Pap: ASCUS, +HR HPV type 16 (not 18 or other). Plan Colpo  18 [place] Bx's & ECC - Negative. Plan cotest in 1 year.   19 ATYP pap - atypical glandular/endocervical cells, + HR HPV 16 (Neg 18 & other)  19:  Colpo. ECC negative. Endometrial biopsy negative. Cervical biopsy with HGSIL/SMILE. Surg path consult at [facility] confirms SMILE    Review of Systems:  Systemic:No weight changes.    Skin : No skin changes or new lesions.   Eye : No changes in vision.   Pulmonary: No cough or SOB.   Cardiovascular: No CP or palpitations  Gastrointestinal : No diarrhea, constipation, abdominal pain. Bowel habits normal.   Genitourinary: No dysuria, urgency or bleeding  Psychiatric: No depression or anxiety  Hematologic : No palpable lymph nodes.   Endocrine : No hot flashes. No heat/cold intolerance.      Neurological: No headaches, no  "numbness.     Past Medical History:   Diagnosis Date     Abnormal Pap smear of cervix 2018,     see Problem List     Cervical high risk HPV (human papillomavirus) test positive 2018,     See Problem List     Chlamydia 2009     URIEL III with severe dysplasia 2019    see problem list     Implantable subdermal contraceptive surveillance 2014    Placed at [facility] 2011 -  Removed 2012         Past Surgical History:   Procedure Laterality Date     APPENDECTOMY  ?        OBGYN history and Health Maintenance:    Last Pap Smear:   Per HPI  Last Mammogram:never  Last Colonoscopy: never    No current outpatient medications on file.            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 No history of cancer in first degree relatives.        Physical Exam:     /83 (BP Location: Right arm, Patient Position: Chair, Cuff Size: Adult Regular)   Pulse 66   Temp 98.3  F (36.8  C) (Oral)   Ht 1.686 m (5' 6.38\")   Wt 78 kg (171 lb 14.4 oz)   LMP 2019 (Exact Date)   SpO2 99%   BMI 27.43 kg/m     Body mass index is 27.43 kg/m .    General: Alert and oriented, no acute distress  Psych: Mood stable. Linear speech, appropriate affect  Cardiovascular: RRR, no murmors  Pulmonary: Lungs clear . Normal respiratory effort  GI: No distention. No masses. No hernia.   Lymph: No enlarge lymph nodes in neck or groin  : Normal external genitalia. Cervix: no visible lesions present, minimal friable tissue at " biopsy sites.Colpo not performed. BME without masses    Patient Name: HEIDE VALDIVIA   MR#: -[id number]   Specimen #: [id number]   Collected: 10/1/2019   Received: 10/1/2019   Reported: 10/1/2019 12:47   Ordering Phy(s): OMAR FREIRE     For improved result formatting, select 'View Enhanced Report Format' under    Linked Documents section.     TEST(S):   8 slides, case #[id number]     FINAL DIAGNOSIS:   CASE FROM [place], [place] ([id number], OBTAINED 2019):   A. CERVIX, BIOPSY:   - Stratified mucin-producing intraepithelial lesion (SMILE)     COMMENT:   There is a focus of high grade squamous intraepithelial lesion involving   multilayered cells with   intracytoplasmic mucin (Am J Surg Pathol 2000;24:1414-9).     Immunohistochemical stains for p16 and Ki-67 as   well as mucicarmine stain were prepared and the results support the given   diagnosis.     I have personally reviewed all specimens and/or slides, including the   listed special stains, and used them   with my medical judgement to determine or confirm the final diagnosis.     Electronically signed out by:     Mat Darnell M.D., PhD, Physicians       Assessment:    Heide Valdivia is a 29 year old woman with a new diagnosis of cervical dysplasia, SMILE.       Plan:     1.)   SMILE of cervix:  LImited data, but this likely behaves like AIS and is an HPV related lesions. Recommend cervical conization. Discussed surgical risks not limited to bleeding, infection, damage to surrounding organs, need for blood transfusions. Consents reviewed and signed today. Discussed implications for future pregnancies (increase risk of  delivery and possible fertility effects). Also recommend waiting ~1 year before attempting pregnancy. Discussed birth control options.       -cervical conization.   -Surveillance post procedure per ASCCP guidelines     References:    PMID: [id number]   PMID:  [id number]    2.) Genetic risk factors were assessed: No need for genetic testing or further evaluation at this time.         A total of 60 minutes was spent with the patient, 55 minutes of which were spent in counseling the patient and/or treatment planning.    Korin Srivastava MD    Department of Ob/Gyn and Women's Health  Division of Gynecologic Oncology  [facility]  [phone]    PMID: [id number]         Again, thank you for allowing me to participate in the care of your patient.      Sincerely,    Korin Srivastava MD

## 2019-10-11 NOTE — NURSING NOTE
Pre Op Nurse Teaching Template    Relevant Diagnosis: Cervical dysplasia     Teaching Topic: Cervical cone, colposcopy    Person(s) involved in teaching :  Patient  & other: Family  Motivation Level:  Asks Questions:    Yes      Eager to Learn:     Yes     Cooperative:          Yes    Receptive (willing. Able to accept information):    Yes      Patient and those who are listed above demonstrates understanding of the following:   Reason for the appointment, diagnosis and treatment plan:   Yes   Knowledge of proper use of medications and conditions for which they are ordered (with special attention to potential side effects or drug interactions): Yes   Which situations necessitate calling provider and whom to contact: Yes         Nutritional needs and diet plan:  Yes      Pain management techniques:     Yes, Pain Scale   Diet:   Yes, [facility] Diet Instructions    Teaching Concerns addressed: Yes    Infection Prevention:  Patient and those who are listed above demonstrate understanding of the following:  Pre-Op CHG Bathing Instructions: Yes  Surgical procedure site care taught:   Yes   Signs and symptoms of infection taught: Yes       Instructional Materials Used/Given:  The [facility] Before You Surgery Booklet  Showering or Bathing before Surgery Instructions & CHG Product  Home Care after Major Abdominal or Vaginal Surgery  Copy of Surgical Consent    Comments:  GILMER Melton RN

## 2019-10-11 NOTE — TELEPHONE ENCOUNTER
I scheduled surgery for this patient with Dr. Srivastava on 10/18 at the [facility]. Scheduled per availability/request.    I called the patient and left a voicemail to confirm the scheduled dates.     The RN has provided them with education and a packet regarding their procedure.     They are aware that they will receive a call  ~2 days prior to the scheduled procedure and will be given an exact arrival/start time.      Post-Op: 11/8 at 11:50 AM

## 2019-10-17 ENCOUNTER — ANESTHESIA EVENT (OUTPATIENT)
Dept: SURGERY | Facility: AMBULATORY SURGERY CENTER | Age: 29
End: 2019-10-17

## 2019-10-18 ENCOUNTER — HOSPITAL ENCOUNTER (OUTPATIENT)
Facility: AMBULATORY SURGERY CENTER | Age: 29
End: 2019-10-18
Attending: OBSTETRICS & GYNECOLOGY
Payer: COMMERCIAL

## 2019-10-18 ENCOUNTER — ANESTHESIA (OUTPATIENT)
Dept: SURGERY | Facility: AMBULATORY SURGERY CENTER | Age: 29
End: 2019-10-18

## 2019-10-18 VITALS
DIASTOLIC BLOOD PRESSURE: 54 MMHG | OXYGEN SATURATION: 98 % | TEMPERATURE: 98.5 F | HEIGHT: 67 IN | HEART RATE: 70 BPM | WEIGHT: 171 LBS | RESPIRATION RATE: 16 BRPM | BODY MASS INDEX: 26.84 KG/M2 | SYSTOLIC BLOOD PRESSURE: 105 MMHG

## 2019-10-18 DIAGNOSIS — N87.9 CERVIX DYSPLASIA: ICD-10-CM

## 2019-10-18 LAB
HCG UR QL: NEGATIVE
INTERNAL QC OK POCT: YES

## 2019-10-18 RX ORDER — EPHEDRINE SULFATE 50 MG/ML
INJECTION, SOLUTION INTRAMUSCULAR; INTRAVENOUS; SUBCUTANEOUS PRN
Status: DISCONTINUED | OUTPATIENT
Start: 2019-10-18 | End: 2019-10-18

## 2019-10-18 RX ORDER — PROPOFOL 10 MG/ML
INJECTION, EMULSION INTRAVENOUS CONTINUOUS PRN
Status: DISCONTINUED | OUTPATIENT
Start: 2019-10-18 | End: 2019-10-18

## 2019-10-18 RX ORDER — ACETIC ACID 5 %
LIQUID (ML) MISCELLANEOUS PRN
Status: DISCONTINUED | OUTPATIENT
Start: 2019-10-18 | End: 2019-10-18 | Stop reason: HOSPADM

## 2019-10-18 RX ORDER — SODIUM CHLORIDE, SODIUM LACTATE, POTASSIUM CHLORIDE, CALCIUM CHLORIDE 600; 310; 30; 20 MG/100ML; MG/100ML; MG/100ML; MG/100ML
INJECTION, SOLUTION INTRAVENOUS CONTINUOUS PRN
Status: DISCONTINUED | OUTPATIENT
Start: 2019-10-18 | End: 2019-10-18

## 2019-10-18 RX ORDER — PROPOFOL 10 MG/ML
INJECTION, EMULSION INTRAVENOUS PRN
Status: DISCONTINUED | OUTPATIENT
Start: 2019-10-18 | End: 2019-10-18

## 2019-10-18 RX ORDER — NALOXONE HYDROCHLORIDE 0.4 MG/ML
.1-.4 INJECTION, SOLUTION INTRAMUSCULAR; INTRAVENOUS; SUBCUTANEOUS
Status: DISCONTINUED | OUTPATIENT
Start: 2019-10-18 | End: 2019-10-19 | Stop reason: HOSPADM

## 2019-10-18 RX ORDER — IBUPROFEN 200 MG
600 TABLET ORAL
Status: DISCONTINUED | OUTPATIENT
Start: 2019-10-18 | End: 2019-10-19 | Stop reason: HOSPADM

## 2019-10-18 RX ORDER — IBUPROFEN 600 MG/1
600 TABLET, FILM COATED ORAL EVERY 6 HOURS PRN
Qty: 30 TABLET | Refills: 0 | Status: SHIPPED | OUTPATIENT
Start: 2019-10-18 | End: 2020-10-09

## 2019-10-18 RX ORDER — GABAPENTIN 300 MG/1
300 CAPSULE ORAL ONCE
Status: COMPLETED | OUTPATIENT
Start: 2019-10-18 | End: 2019-10-18

## 2019-10-18 RX ORDER — ONDANSETRON 2 MG/ML
INJECTION INTRAMUSCULAR; INTRAVENOUS PRN
Status: DISCONTINUED | OUTPATIENT
Start: 2019-10-18 | End: 2019-10-18

## 2019-10-18 RX ORDER — MEPERIDINE HYDROCHLORIDE 25 MG/ML
12.5 INJECTION INTRAMUSCULAR; INTRAVENOUS; SUBCUTANEOUS
Status: DISCONTINUED | OUTPATIENT
Start: 2019-10-18 | End: 2019-10-19 | Stop reason: HOSPADM

## 2019-10-18 RX ORDER — OXYCODONE HYDROCHLORIDE 5 MG/1
5 TABLET ORAL EVERY 6 HOURS PRN
Qty: 2 TABLET | Refills: 0 | Status: SHIPPED | OUTPATIENT
Start: 2019-10-18 | End: 2019-10-21

## 2019-10-18 RX ORDER — OXYCODONE HYDROCHLORIDE 5 MG/1
5 TABLET ORAL EVERY 4 HOURS PRN
Status: DISCONTINUED | OUTPATIENT
Start: 2019-10-18 | End: 2019-10-19 | Stop reason: HOSPADM

## 2019-10-18 RX ORDER — DEXAMETHASONE SODIUM PHOSPHATE 4 MG/ML
INJECTION, SOLUTION INTRA-ARTICULAR; INTRALESIONAL; INTRAMUSCULAR; INTRAVENOUS; SOFT TISSUE PRN
Status: DISCONTINUED | OUTPATIENT
Start: 2019-10-18 | End: 2019-10-18

## 2019-10-18 RX ORDER — ACETAMINOPHEN 325 MG/1
975 TABLET ORAL ONCE
Status: COMPLETED | OUTPATIENT
Start: 2019-10-18 | End: 2019-10-18

## 2019-10-18 RX ORDER — AMOXICILLIN 250 MG
1-2 CAPSULE ORAL 2 TIMES DAILY
Qty: 30 TABLET | Refills: 0 | Status: SHIPPED | OUTPATIENT
Start: 2019-10-18 | End: 2020-10-09

## 2019-10-18 RX ORDER — CEFAZOLIN SODIUM 2 G/50ML
2 SOLUTION INTRAVENOUS
Status: COMPLETED | OUTPATIENT
Start: 2019-10-18 | End: 2019-10-18

## 2019-10-18 RX ORDER — SODIUM CHLORIDE, SODIUM LACTATE, POTASSIUM CHLORIDE, CALCIUM CHLORIDE 600; 310; 30; 20 MG/100ML; MG/100ML; MG/100ML; MG/100ML
INJECTION, SOLUTION INTRAVENOUS CONTINUOUS
Status: DISCONTINUED | OUTPATIENT
Start: 2019-10-18 | End: 2019-10-19 | Stop reason: HOSPADM

## 2019-10-18 RX ORDER — CEFAZOLIN SODIUM 1 G/50ML
1 SOLUTION INTRAVENOUS SEE ADMIN INSTRUCTIONS
Status: DISCONTINUED | OUTPATIENT
Start: 2019-10-18 | End: 2019-10-18 | Stop reason: HOSPADM

## 2019-10-18 RX ORDER — FENTANYL CITRATE 50 UG/ML
25-50 INJECTION, SOLUTION INTRAMUSCULAR; INTRAVENOUS
Status: DISCONTINUED | OUTPATIENT
Start: 2019-10-18 | End: 2019-10-18 | Stop reason: HOSPADM

## 2019-10-18 RX ORDER — ONDANSETRON 4 MG/1
4 TABLET, ORALLY DISINTEGRATING ORAL EVERY 30 MIN PRN
Status: DISCONTINUED | OUTPATIENT
Start: 2019-10-18 | End: 2019-10-19 | Stop reason: HOSPADM

## 2019-10-18 RX ORDER — GLYCOPYRROLATE 0.2 MG/ML
INJECTION, SOLUTION INTRAMUSCULAR; INTRAVENOUS PRN
Status: DISCONTINUED | OUTPATIENT
Start: 2019-10-18 | End: 2019-10-18

## 2019-10-18 RX ORDER — ONDANSETRON 2 MG/ML
4 INJECTION INTRAMUSCULAR; INTRAVENOUS EVERY 30 MIN PRN
Status: DISCONTINUED | OUTPATIENT
Start: 2019-10-18 | End: 2019-10-19 | Stop reason: HOSPADM

## 2019-10-18 RX ORDER — FENTANYL CITRATE 50 UG/ML
25-50 INJECTION, SOLUTION INTRAMUSCULAR; INTRAVENOUS
Status: DISCONTINUED | OUTPATIENT
Start: 2019-10-18 | End: 2019-10-19 | Stop reason: HOSPADM

## 2019-10-18 RX ORDER — LIDOCAINE 40 MG/G
CREAM TOPICAL
Status: DISCONTINUED | OUTPATIENT
Start: 2019-10-18 | End: 2019-10-18 | Stop reason: HOSPADM

## 2019-10-18 RX ORDER — LIDOCAINE HYDROCHLORIDE 20 MG/ML
INJECTION, SOLUTION INFILTRATION; PERINEURAL PRN
Status: DISCONTINUED | OUTPATIENT
Start: 2019-10-18 | End: 2019-10-18

## 2019-10-18 RX ORDER — FENTANYL CITRATE 50 UG/ML
INJECTION, SOLUTION INTRAMUSCULAR; INTRAVENOUS PRN
Status: DISCONTINUED | OUTPATIENT
Start: 2019-10-18 | End: 2019-10-18

## 2019-10-18 RX ORDER — IODINE AND POTASSIUM IODIDE 50; 100 MG/ML; MG/ML
LIQUID ORAL PRN
Status: DISCONTINUED | OUTPATIENT
Start: 2019-10-18 | End: 2019-10-18 | Stop reason: HOSPADM

## 2019-10-18 RX ORDER — SODIUM CHLORIDE, SODIUM LACTATE, POTASSIUM CHLORIDE, CALCIUM CHLORIDE 600; 310; 30; 20 MG/100ML; MG/100ML; MG/100ML; MG/100ML
INJECTION, SOLUTION INTRAVENOUS CONTINUOUS
Status: DISCONTINUED | OUTPATIENT
Start: 2019-10-18 | End: 2019-10-18 | Stop reason: HOSPADM

## 2019-10-18 RX ORDER — BUPIVACAINE HYDROCHLORIDE AND EPINEPHRINE 2.5; 5 MG/ML; UG/ML
INJECTION, SOLUTION INFILTRATION; PERINEURAL PRN
Status: DISCONTINUED | OUTPATIENT
Start: 2019-10-18 | End: 2019-10-18 | Stop reason: HOSPADM

## 2019-10-18 RX ADMIN — FENTANYL CITRATE 100 MCG: 50 INJECTION, SOLUTION INTRAMUSCULAR; INTRAVENOUS at 08:30

## 2019-10-18 RX ADMIN — EPHEDRINE SULFATE 10 MG: 50 INJECTION, SOLUTION INTRAMUSCULAR; INTRAVENOUS; SUBCUTANEOUS at 08:36

## 2019-10-18 RX ADMIN — GLYCOPYRROLATE 0.2 MG: 0.2 INJECTION, SOLUTION INTRAMUSCULAR; INTRAVENOUS at 08:38

## 2019-10-18 RX ADMIN — CEFAZOLIN SODIUM 2 G: 2 SOLUTION INTRAVENOUS at 08:30

## 2019-10-18 RX ADMIN — EPHEDRINE SULFATE 5 MG: 50 INJECTION, SOLUTION INTRAMUSCULAR; INTRAVENOUS; SUBCUTANEOUS at 08:28

## 2019-10-18 RX ADMIN — LIDOCAINE HYDROCHLORIDE 80 MG: 20 INJECTION, SOLUTION INFILTRATION; PERINEURAL at 08:24

## 2019-10-18 RX ADMIN — ONDANSETRON 4 MG: 2 INJECTION INTRAMUSCULAR; INTRAVENOUS at 08:45

## 2019-10-18 RX ADMIN — PROPOFOL 150 MCG/KG/MIN: 10 INJECTION, EMULSION INTRAVENOUS at 08:26

## 2019-10-18 RX ADMIN — ACETAMINOPHEN 975 MG: 325 TABLET ORAL at 07:17

## 2019-10-18 RX ADMIN — SODIUM CHLORIDE, SODIUM LACTATE, POTASSIUM CHLORIDE, CALCIUM CHLORIDE: 600; 310; 30; 20 INJECTION, SOLUTION INTRAVENOUS at 08:21

## 2019-10-18 RX ADMIN — DEXAMETHASONE SODIUM PHOSPHATE 4 MG: 4 INJECTION, SOLUTION INTRA-ARTICULAR; INTRALESIONAL; INTRAMUSCULAR; INTRAVENOUS; SOFT TISSUE at 08:45

## 2019-10-18 RX ADMIN — GABAPENTIN 300 MG: 300 CAPSULE ORAL at 07:17

## 2019-10-18 RX ADMIN — PROPOFOL 200 MG: 10 INJECTION, EMULSION INTRAVENOUS at 08:24

## 2019-10-18 ASSESSMENT — MIFFLIN-ST. JEOR: SCORE: 1538.28

## 2019-10-18 NOTE — OP NOTE
Procedure Date: 10/18/2019      PREOPERATIVE DIAGNOSES:  Cervical dysplasia (SMILE).        POSTOPERATIVE DIAGNOSIS:  Cervical dysplasia (SMILE).       PROCEDURES PERFORMED:   1.  Colposcopy.   2.  Cervical conization.      SURGEON:  Korin Srivastava MD      ASSISTANTS:  Dorothy Mcknight MD (OB/GYN Resident).      ANESTHESIA:  General.      ESTIMATED BLOOD LOSS:  20 mL.      COMPLICATIONS:  None.      SPECIMENS REMOVED:   1.  Cervical cone with suture at 12 o'clock.   2.  Endocervical curettage.      INDICATIONS:  Ms. Valdivia is a 29-year-old woman with a history of an abnormal Pap smear.  Biopsy revealed a high-grade dysplasia lesion.  Specifically, this was called a SMILE.  She presented to the hospital for further surgical diagnosis and management.      FINDINGS:  She had normal external genitalia and no pelvic masses.  Colposcopy was notable for a normal nulliparous-appearing ectocervix.  After acetic acid was placed, she had good visualization of her transformation zone.  Thus, the colposcopy was adequate.  I could not obviously see any high-grade lesions.  She had no abnormal vascular areas and again no obvious high-grade dysplasia.  However, given the biopsy, we proceeded with a conization.      DESCRIPTION OF PROCEDURE:  After informed consent, the patient was brought to the operating room where general anesthesia was administered.  She was prepped and draped in the dorsal lithotomy position and a Albrecht catheter was placed in her bladder.  She received Ancef for preoperative prophylactic antibiotics and her urine pregnancy test was negative.  Surgical timeout was performed ensuring correct patient and procedure.      A speculum was placed in the vagina.  I first started by performing a colposcopy.  I placed acetic acid on the cervix and visualized the cervix under high magnification with the above-findings noted.  We then prepped and draped her perineum.  I then drained the bladder for 100 mL of clear  urine.  I then placed Lugol solution over the cervix to further visualize her ectocervix.  I then placed a single-tooth tenaculum at the 12-o'clock position and injected a total of 10 mL of lidocaine with epinephrine circumferentially into the cervix.  I then used the #11 blade on a long knife handle to perform a conization in the typical fashion.  The specimen was removed intact and the suture was placed at the 12-o'clock position.  I then used Bovie cautery to cauterize the surgical bed and I did perform an endocervical curettage which was sent for permanent pathology.  I then placed 2 modified Sturmdorf sutures circumferentially and placed some Surgicel inside the endocervix and tied the sutures tight and flush against the cervix.  The surgical site was very hemostatic.  The patient was awoken from anesthesia and brought to the recovery room in stable condition.         NEISHA BANKS MD             D: 10/18/2019   T: 10/18/2019   MT: [email or web address]      Name:     HEIDE HOWARD   MRN:      [id number]        Account:        [id number]   :      1990           Procedure Date: 10/18/2019      Document: [id number]

## 2019-10-18 NOTE — ANESTHESIA POSTPROCEDURE EVALUATION
Anesthesia POST Procedure Evaluation    Patient: Shonda Valdivia   MRN:     [id number] Gender:   female   Age:    29 year old :      1990        Preoperative Diagnosis: Cervix dysplasia [N87.9]   Procedure(s):  COLPOSCOPY, CERVICAL Conization   Postop Comments: No value filed.       Anesthesia Type:  Not documented  MAC    Reportable Event: NO     PAIN: Uncomplicated   Sign Out status: Comfortable, Well controlled pain     PONV: No PONV   Sign Out status:  No Nausea or Vomiting     Neuro/Psych: Uneventful perioperative course   Sign Out Status: Preoperative baseline; Age appropriate mentation     Airway/Resp.: Uneventful perioperative course   Sign Out Status: Non labored breathing, age appropriate RR; Resp. Status within EXPECTED Parameters     CV: Uneventful perioperative course   Sign Out status: Appropriate BP and perfusion indices; Appropriate HR/Rhythm     Disposition:   Sign Out in:  PACU  Disposition:  Phase II; Home  Recovery Course: Uneventful  Follow-Up: Not required           Last Anesthesia Record Vitals:  CRNA VITALS  10/18/2019 0853 - 10/18/2019 0953      10/18/2019             Pulse:  81    SpO2:  98 %    Resp Rate (observed):  15          Last PACU Vitals:  Vitals Value Taken Time   /62 10/18/2019  9:36 AM   Temp 36.8  C (98.3  F) 10/18/2019  9:36 AM   Pulse 70 10/18/2019  9:36 AM   Resp 16 10/18/2019  9:37 AM   SpO2 95 % 10/18/2019  9:37 AM   Temp src     NIBP     Pulse     SpO2     Resp     Temp     Ht Rate     Temp 2     Vitals shown include unvalidated device data.      Electronically Signed By: Fidel Joaquin MD, MD, 2019, 10:36 AM

## 2019-10-18 NOTE — ANESTHESIA PREPROCEDURE EVALUATION
Anesthesia Pre-Procedure Evaluation    Patient: Shonda Valdivia   MRN:     [id number] Gender:   female   Age:    29 year old :      1990        Preoperative Diagnosis: Cervix dysplasia [N87.9]   Procedure(s):  COLPOSCOPY, CERVICAL Conization     Past Medical History:   Diagnosis Date     Abnormal Pap smear of cervix 2018,     see Problem List     Cervical high risk HPV (human papillomavirus) test positive 2018,     See Problem List     Chlamydia 2009     URIEL III with severe dysplasia 2019    see problem list     Implantable subdermal contraceptive surveillance 2014    Placed at [facility] 2011 -  Removed 2012        Past Surgical History:   Procedure Laterality Date     APPENDECTOMY  ?    open          Anesthesia Evaluation     . Pt has had prior anesthetic. Type: General    No history of anesthetic complications          ROS/MED HX    ENT/Pulmonary:  - neg pulmonary ROS     Neurologic:  - neg neurologic ROS     Cardiovascular:  - neg cardiovascular ROS       METS/Exercise Tolerance:  4 - Raking leaves, gardening   Hematologic:  - neg hematologic  ROS       Musculoskeletal:  - neg musculoskeletal ROS       GI/Hepatic:  - neg GI/hepatic ROS       Renal/Genitourinary:  - ROS Renal section negative       Endo:  - neg endo ROS       Psychiatric:  - neg psychiatric ROS       Infectious Disease:  - neg infectious disease ROS       Malignancy:         Other:                         PHYSICAL EXAM:   Mental Status/Neuro: A/A/O   Airway: Facies: Feasible  Mallampati: I  Mouth/Opening: Full  TM distance: > 6 cm  Neck ROM: Full   Respiratory: Auscultation: CTAB     Resp. Rate: Normal     Resp. Effort: Normal      CV: Rhythm: Regular  Rate: Age appropriate  Heart: Normal Sounds  Edema: None   Comments:      Dental: Normal Dentition                LABS:  CBC:   Lab Results   Component Value Date    WBC 7.7 10/17/2016    HGB 13.9 10/17/2016    HCT 41.6 10/17/2016    PLT  "299 10/17/2016     BMP:   Lab Results   Component Value Date     10/17/2016    POTASSIUM 4.1 10/17/2016    CHLORIDE 104 10/17/2016    CO2 26 10/17/2016    BUN 12 10/17/2016    CR 0.75 10/17/2016    GLC 84 10/17/2016     COAGS: No results found for: PTT, INR, FIBR  POC:   Lab Results   Component Value Date    HCG Negative 10/18/2019     OTHER:   Lab Results   Component Value Date    JANIE 9.2 10/17/2016    TSH 0.56 10/17/2016        Preop Vitals    BP Readings from Last 3 Encounters:   10/18/19 114/68   10/11/19 125/83   10/08/19 118/76    Pulse Readings from Last 3 Encounters:   10/18/19 62   10/11/19 66   10/08/19 74      Resp Readings from Last 3 Encounters:   10/18/19 16   10/08/19 16   08/02/19 18    SpO2 Readings from Last 3 Encounters:   10/18/19 96%   10/11/19 99%   08/02/19 98%      Temp Readings from Last 1 Encounters:   10/18/19 36.7  C (98.1  F) (Oral)    Ht Readings from Last 1 Encounters:   10/18/19 1.702 m (5' 7\")      Wt Readings from Last 1 Encounters:   10/18/19 77.6 kg (171 lb)    Estimated body mass index is 26.78 kg/m  as calculated from the following:    Height as of this encounter: 1.702 m (5' 7\").    Weight as of this encounter: 77.6 kg (171 lb).     LDA:  Peripheral IV 10/18/19 Right Hand (Active)   Site Assessment WDL 10/18/2019  7:24 AM   Line Status Infusing 10/18/2019  7:24 AM   Phlebitis Scale 0-->no symptoms 10/18/2019  7:24 AM   Infiltration Scale 0 10/18/2019  7:24 AM   Extravasation? No 10/18/2019  7:24 AM   Dressing Intervention New dressing  10/18/2019  7:24 AM   Number of days: 0       Airway - Adult/Peds laryngeal mask airway (Active)   Number of days: 0        Assessment:   ASA SCORE: 1      Smoking Status:  Non-Smoker/Unknown   NPO Status: NPO Appropriate     Plan:   Anes. Type:  MAC   Pre-Medication: None   Induction:  N/a   Airway: Native Airway   Access/Monitoring: PIV   Maintenance: N/a     Postop Plan:   Postop Pain: None  Postop Sedation/Airway: Not " planned  Disposition: Outpatient     PONV Management:   Adult Risk Factors: Female, Non-Smoker   Prevention: Ondansetron, Dexamethasone     CONSENT: Direct conversation   Plan and risks discussed with: Patient                      Fidel Joaquni MD, MD

## 2019-10-18 NOTE — BRIEF OP NOTE
[facility]    Brief Operative Note    Pre-operative diagnosis: Cervix dysplasia [N87.9]  Post-operative diagnosis Same as pre-operative diagnosis    Procedure: Procedure(s):  COLPOSCOPY, CERVICAL Conization  Surgeon: Surgeon(s) and Role:     * Korin Srivastava MD - Primary     * Dorothy Mcknight MD - Resident - Assisting  Anesthesia: General   Estimated blood loss: 30 ml  Drains: None  UOP: 100 ml  Specimens:   ID Type Source Tests Collected by Time Destination   A : endocervical currettage Tissue Cervix SURGICAL PATHOLOGY EXAM Korin Srivastava MD 10/18/2019  8:58 AM    B : Cervical Cone. Stitch at 1200 Tissue Cervix SURGICAL PATHOLOGY EXAM Korin Srivastava MD 10/18/2019  9:12 AM      Findings:   Normal external genitalia. On colposcopy entire SCJ is visualized. With acetic acid application, diffuse faint acetowhite changes surrounding SCJ consistent with low grade HPV change. Island of acetowhite change that is more opaque at 10 o'clock.  With Lugol's solution applied non-staining area consistent with SCJ visualized previously and extending further out from os at 6 o'clock. After conization hemostasis achieved with vasopressin, electrocautery, modified Sturmdorf suture, Surgicel and Monsel's solution.  Complications: None.  Implants: * No implants in log *     Dorothy Mcknight MD  Obstetrics & Gynecology, PGY-2  10/18/2019 9:38 AM

## 2019-10-18 NOTE — DISCHARGE INSTRUCTIONS
"[facility]  Home Care Following Anesthesia  For 24 hours after surgery:  1. Get plenty of rest.  A responsible adult must stay with you for at least 24 hours after you leave the surgery center.  2. Do not drive or use heavy equipment.  If you have weakness or tingling, don't drive or use heavy equipment until this feeling goes away.   3. Do not drink alcohol.   4. Avoid strenuous or risky activities.  Ask for help when climbing stairs.  5. You may feel lightheaded.  IF so, sit for a few minutes before standing.  Have someone help you get up.   6. If you have nausea (feel sick to your stomach): Drink only clear liquids such as apple juice, ginger ale, broth or 7-Up.  Rest may also help.  Be sure to drink enough fluids.  Move to a regular diet as you feel able.   7. You may have a slight fever.  Call the doctor if your fever is over 100 F (37.7 C) (taken under the tongue) or lasts longer than 24 hours.  8. You may have a dry mouth, a sore throat, muscle aches or trouble sleeping. These should go away after 24 hours.  9. Do not make important or legal decisions.   If you use hormonal birth control (such as the pill, patch, ring or implants):  You will need a second form of birth control for 7 days (condoms, a diaphragm or contraceptive foam).  While in the surgery center, you received a medicine called Sugammadex.  Hormonal birth control (such as the pill, patch, ring or implants) will not work as well for a week after taking this medicine.  Today you received a Marcaine or bupivacaine block to numb the nerves near your surgery site.  This is a block using local anesthetic or \"numbing\" medication injected around the nerves to anesthetize or \"numb\" the area supplied by those nerves.  This block is injected into the muscle layer near your surgical site.  The medication may numb the location where you had surgery for 6-18 hours, but may last up to 24 hours.  If your surgical site is " an arm or leg you should be careful with your affected limb, since it is possible to injure your limb without being aware of it due to the numbing.  Until full feeling returns, you should guard against bumping or hitting your limb, and avoid extreme hot or cold temperatures on the skin.  As the block wears off, the feeling will return as a tingling or prickly sensation near your surgical site.  You will experience more discomfort from your incision as the feeling returns.  You may want to take a pain pill (a narcotic or Tylenol if this was prescribed by your surgeon) when you start to experience mild pain before the pain beccomes more severe.  If your pain medications do not control your pain you should notifiy your surgeon.    Tips for taking pain medications  To get the best pain relief possible, remember these points:    Take pain medications as directed, before pain becomes severe.    Pain medication can upset your stomach: taking it with food may help.    Constipation is a common side effect of pain medication. Drink plenty of  fluids.    Eat foods high in fiber. Take a stool softener if recommended by your doctor or pharmacist.    Do not drink alcohol, drive or operate machinery while taking pain medications.    Ask about other ways to control pain, such as with heat, ice or relaxation.    Tylenol/Acetaminophen Consumption  To help encourage the safe use of acetaminophen, the makers of TYLENOL  have lowered the maximum daily dose for single-ingredient Extra Strength TYLENOL  (acetaminophen) products sold in the [place] from 8 pills per day (4,000 mg) to 6 pills per day (3,000 mg). The dosing interval has also changed from 2 pills every 4-6 hours to 2 pills every 6 hours.    If you feel your pain relief is insufficient, you may take Tylenol/Acetaminophen in addition to your narcotic pain medication.     Be careful not to exceed 3,000 mg of Tylenol/Acetaminophen in a 24 hour period from all sources.    If you are  taking extra strength Tylenol/acetaminophen (500 mg), the maximum dose is 6 tablets in 24 hours.    If you are taking regular strength acetaminophen (325 mg), the maximum dose is 9 tablets in 24 hours.    Call a doctor for any of the followin. Signs of infection (fever, growing tenderness at the surgery site, a large amount of drainage or bleeding, severe pain, foul-smelling drainage, redness, swelling).  2. It has been over 8 to 10 hours since surgery and you are still not able to urinate (pass water).  3. Headache for over 24 hours.  4. Numbness, tingling or weakness the day after surgery (if you had spinal anesthesia).  Your doctor is:  Dr. Korin Srivastava, Gynecologic Oncology: [phone]                    Or dial [phone] and ask for the resident on call for:  Gynecologic Oncology  For emergency care, call the:  [facility]:  [phone] (TTY for hearing impaired: [phone])

## 2019-10-18 NOTE — ANESTHESIA CARE TRANSFER NOTE
Patient: Shonda Valdivia    Procedure(s):  COLPOSCOPY, CERVICAL Conization    Diagnosis: Cervix dysplasia [N87.9]  Diagnosis Additional Information: No value filed.    Anesthesia Type:   MAC     Note:  Airway :Face Mask  Patient transferred to:PACU  Handoff Report: Identifed the Patient, Identified the Reponsible Provider, Reviewed the pertinent medical history, Discussed the surgical course, Reviewed Intra-OP anesthesia mangement and issues during anesthesia, Set expectations for post-procedure period and Allowed opportunity for questions and acknowledgement of understanding      Vitals: (Last set prior to Anesthesia Care Transfer)    CRNA VITALS  10/18/2019 0853 - 10/18/2019 0928      10/18/2019             Pulse:  81    SpO2:  98 %    Resp Rate (observed):  15                Electronically Signed By: CATIA Pickett CRNA  October 18, 2019  9:28 AM

## 2019-10-21 ENCOUNTER — MYC MEDICAL ADVICE (OUTPATIENT)
Dept: ONCOLOGY | Facility: CLINIC | Age: 29
End: 2019-10-21

## 2019-10-21 LAB — COPATH REPORT: NORMAL

## 2019-10-25 NOTE — TELEPHONE ENCOUNTER
Called to review path  CIN3. Negative margins  Followup as scheduled      Korin Srivastava MD  Gynecologic Oncology  Pager [phone]

## 2019-11-08 ENCOUNTER — OFFICE VISIT (OUTPATIENT)
Dept: ONCOLOGY | Facility: CLINIC | Age: 29
End: 2019-11-08
Attending: OBSTETRICS & GYNECOLOGY
Payer: COMMERCIAL

## 2019-11-08 VITALS
BODY MASS INDEX: 26.94 KG/M2 | OXYGEN SATURATION: 98 % | TEMPERATURE: 98.9 F | DIASTOLIC BLOOD PRESSURE: 75 MMHG | RESPIRATION RATE: 16 BRPM | SYSTOLIC BLOOD PRESSURE: 117 MMHG | HEART RATE: 64 BPM | WEIGHT: 172 LBS

## 2019-11-08 DIAGNOSIS — N87.9 CERVIX DYSPLASIA: Primary | ICD-10-CM

## 2019-11-08 PROCEDURE — 99213 OFFICE O/P EST LOW 20 MIN: CPT | Mod: 24 | Performed by: OBSTETRICS & GYNECOLOGY

## 2019-11-08 PROCEDURE — 40000114 ZZH STATISTIC NO CHARGE CLINIC VISIT

## 2019-11-08 ASSESSMENT — PAIN SCALES - GENERAL: PAINLEVEL: NO PAIN (0)

## 2019-11-08 NOTE — LETTER
2019       RE: Shonda Valdivia  [id number]  [facility] [address]     Dear Colleague,    Thank you for referring your patient, Shonda Valdivia, to the [facility]. Please see a copy of my visit note below.    GYN Oncology Followup    Referring provider:    Jen Elder MD  [address] [place] [address]   RE: Shonda Valdivia  : 1990  MOMO: 2019       CC: Stratified mucin-producing intraepithelial lesion of the cervix    HPI: Ms Shonda Valdivia is a 29 year old year old G0 female who presents for consultation regarding recent abnormal pap followed by colposcopy demonstrating focal HSIL and stratified mucin producing intraepithelial lesion of the cervix in the setting of HPV 16+. She is here today with her mother and sister to discuss treatment options.      She notes that she desires fertility in the future. She is not currently taking any OCP's and uses condoms as her primary method of birth control. Denies pelvic pain, cramping, abnormal bleeding, and discharge.     Treatment History:   2018 Pap: ASCUS, +HR HPV type 16 (not 18 or other). Plan Colpo  18 Macy Bx's & ECC - Negative. Plan cotest in 1 year.   19 ATYP pap - atypical glandular/endocervical cells, + HR HPV 16 (Neg 18 & other)  19:  Colpo. ECC negative. Endometrial biopsy negative. Cervical biopsy with HGSIL/SMILE. Surg path consult at [facility] confirms SMILE  10/18/19: Cervical conization. CIN3 with negative margins. ECC negative.     Review of Systems:  Systemic:No weight changes.    Skin : No skin changes or new lesions.   Eye : No changes in vision.   Pulmonary: No cough or SOB.   Cardiovascular: No CP or palpitations  Gastrointestinal : No diarrhea, constipation, abdominal pain. Bowel habits normal.   Genitourinary: No dysuria, urgency or bleeding  Psychiatric: No depression or anxiety  Hematologic : No palpable lymph nodes.   Endocrine : No hot flashes. No  heat/cold intolerance.      Neurological: No headaches, no numbness.     Past Medical History:   Diagnosis Date     Abnormal Pap smear of cervix 2018,     see Problem List     Cervical high risk HPV (human papillomavirus) test positive 2018,     See Problem List     Chlamydia 2009     URIEL III with severe dysplasia 2019    see problem list     Implantable subdermal contraceptive surveillance 2014    Placed at [facility] 2011 -  Removed 2012         Past Surgical History:   Procedure Laterality Date     APPENDECTOMY  ?    open     COLPOSCOPY, CONIZATION, COMBINED N/A 10/18/2019    Procedure: COLPOSCOPY, CERVICAL Conization;  Surgeon: Korin Srivastava MD;  Location:  OR        [facility] history and Health Maintenance:    Last Pap Smear:   Per HPI  Last Mammogram:never  Last Colonoscopy: never    Current Outpatient Medications   Medication Sig Dispense Refill     ibuprofen (ADVIL/MOTRIN) 600 MG tablet Take 1 tablet (600 mg) by mouth every 6 hours as needed for other (mild and/or inflammatory pain) 30 tablet 0     senna-docusate (SENOKOT-S/PERICOLACE) 8.6-50 MG tablet Take 1-2 tablets by mouth 2 times daily 30 tablet 0            Allergies   Allergen Reactions     Nkda [No Known Drug Allergies]         Social History:  Social History     Tobacco Use     Smoking status: Never Smoker     Smokeless tobacco: Never Used   Substance Use Topics     Alcohol use: Yes     Comment: occ     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 No history of cancer in first degree relatives.        Physical Exam:     /75   Pulse 64   Temp 98.9  F (37.2  C) (Oral)   Resp 16   Wt 78 kg (172 lb)   SpO2 98%   BMI 26.94 kg/m     Body mass index is 26.94  kg/m .    General: Alert and oriented, no acute distress  Psych: Mood stable. Linear speech, appropriate affect  Cardiovascular: RRR, no murmors  Pulmonary: Lungs clear . Normal respiratory effort  GI: No distention. No masses. No hernia.   Lymph: No enlarge lymph nodes in neck or groin  : Normal external genitalia. Cervix with evidence of CKC. No bleeding.     Patient Name: HEIDE VALDIVIA   MR#: -[id number]   Specimen #: [id number]   Collected: 10/1/2019   Received: 10/1/2019   Reported: 10/1/2019 12:47   Ordering Phy(s): OMAR FREIRE     For improved result formatting, select 'View Enhanced Report Format' under    Linked Documents section.     TEST(S):   8 slides, case #[id number]     FINAL DIAGNOSIS:   CASE FROM [place], [place] ([id number], OBTAINED 09/20/2019):   A. CERVIX, BIOPSY:   - Stratified mucin-producing intraepithelial lesion (SMILE)     COMMENT:   There is a focus of high grade squamous intraepithelial lesion involving   multilayered cells with   intracytoplasmic mucin (Am J Surg Pathol 2000;24:1414-9).     Immunohistochemical stains for p16 and Ki-67 as   well as mucicarmine stain were prepared and the results support the given   diagnosis.     I have personally reviewed all specimens and/or slides, including the   listed special stains, and used them   with my medical judgement to determine or confirm the final diagnosis.     Electronically signed out by:     Mat Darnell M.D., PhD, Physicians       Assessment:    Heide Valdivia is a 29 year old woman with a new diagnosis of cervical dysplasia      Plan:     1.)   CIN3/SMILE of cervix:  Reviewed pathology. No SMILE in CKC specimen. Discussed surveillance options. I think given the fact the CKC specimen showed only CIN3 with negative margins, cytology at 12 and 24 months would be reasonable. Discussed risk of recurrence/skip lesions. Discussed importance of waiting ~1 year before  getting pregnant to improve pregnancy outcomes.       - Followup cervical cytology with ECC in 12 and 24 months. OK if general OBGYN does this  - Recommend birth control (no pregnancy) for 12 months. She declines any OCPs today.         2.) Genetic risk factors were assessed: No need for genetic testing or further evaluation at this time.       3.) Postop: Doing well.     Total time spent with face to face with the patient today was 20 minutes, 10 minutes was doing post op follow up. Greater than 50% of the remaining 10 minutes was spent counseling/or coordinating care with the patient on issues described above.        Korin Srivastava MD    Department of Ob/Gyn and Women's Health  Division of Gynecologic Oncology  [facility]  [phone]      References:    PMID: [id number]   PMID: [id number]        Again, thank you for allowing me to participate in the care of your patient.      Sincerely,    Korin Srivastava MD

## 2019-11-08 NOTE — PROGRESS NOTES
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 She is here today with her mother and sister to discuss treatment options.      She notes that she desires fertility in the future. She is not currently taking any OCP's and uses condoms as her primary method of birth control. Denies pelvic pain, cramping, abnormal bleeding, and discharge.     Treatment History:   2018 Pap: ASCUS, +HR HPV type 16 (not 18 or other). Plan Colpo  18 [place] Bx's & ECC - Negative. Plan cotest in 1 year.   19 ATYP pap - atypical glandular/endocervical cells, + HR HPV 16 (Neg 18 & other)  19:  Colpo. ECC negative. Endometrial biopsy negative. Cervical biopsy with HGSIL/SMILE. Surg path consult at [facility] confirms SMILE  10/18/19: Cervical conization. CIN3 with negative margins. ECC negative.     Review of Systems:  Systemic:No weight changes.    Skin : No skin changes or new lesions.   Eye : No changes in vision.   Pulmonary: No cough or SOB.   Cardiovascular: No CP or palpitations  Gastrointestinal : No diarrhea, constipation, abdominal pain. Bowel habits normal.   Genitourinary: No dysuria, urgency or bleeding  Psychiatric: No depression or anxiety  Hematologic : No palpable lymph nodes.   Endocrine : No hot flashes. No heat/cold intolerance.      Neurological: No headaches, no numbness.     Past Medical History:   Diagnosis Date     Abnormal Pap smear of cervix 2018,     see Problem List     Cervical high risk HPV (human papillomavirus) test positive  2018, 2019    See Problem List     Chlamydia 2009     URIEL III with severe dysplasia 2019    see problem list     Implantable subdermal contraceptive surveillance 2014    Placed at [facility] 2011 -  Removed 2012         Past Surgical History:   Procedure Laterality Date     APPENDECTOMY  ?    open     COLPOSCOPY, CONIZATION, COMBINED N/A 10/18/2019    Procedure: COLPOSCOPY, CERVICAL [place];  Surgeon: Korin Srivastava MD;  Location:  OR        [facility] history and Health Maintenance:    Last Pap Smear:   Per HPI  Last Mammogram:never  Last Colonoscopy: never    Current Outpatient Medications   Medication Sig Dispense Refill     ibuprofen (ADVIL/MOTRIN) 600 MG tablet Take 1 tablet (600 mg) by mouth every 6 hours as needed for other (mild and/or inflammatory pain) 30 tablet 0     senna-docusate (SENOKOT-S/PERICOLACE) 8.6-50 MG tablet Take 1-2 tablets by mouth 2 times daily 30 tablet 0            Allergies   Allergen Reactions     Nkda [No Known Drug Allergies]         Social History:  Social History     Tobacco Use     Smoking status: Never Smoker     Smokeless tobacco: Never Used   Substance Use Topics     Alcohol use: Yes     Comment: occ     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 No history of cancer in first degree relatives.        Physical Exam:     /75   Pulse 64   Temp 98.9  F (37.2  C) (Oral)   Resp 16   Wt 78 kg (172 lb)   SpO2 98%   BMI 26.94 kg/m    Body mass index is 26.94 kg/m .    General: Alert and oriented, no acute distress  Psych: Mood stable. Linear speech, appropriate affect  Cardiovascular: RRR, no murmors  Pulmonary: Lungs clear . Normal respiratory effort  GI: No distention. No masses. No hernia.   Lymph: No  enlarge lymph nodes in neck or groin  : Normal external genitalia. Cervix with evidence of CKC. No bleeding.     Patient Name: HEIDE VALDIVIA   MR#: -[id number]   Specimen #: [id number]   Collected: 10/1/2019   Received: 10/1/2019   Reported: 10/1/2019 12:47   Ordering Phy(s): OMAR FREIRE     For improved result formatting, select 'View Enhanced Report Format' under    Linked Documents section.     TEST(S):   8 slides, case #[id number]     FINAL DIAGNOSIS:   CASE FROM [place], [place] ([id number], OBTAINED 09/20/2019):   A. CERVIX, BIOPSY:   - Stratified mucin-producing intraepithelial lesion (SMILE)     COMMENT:   There is a focus of high grade squamous intraepithelial lesion involving   multilayered cells with   intracytoplasmic mucin (Am J Surg Pathol 2000;24:1414-9).     Immunohistochemical stains for p16 and Ki-67 as   well as mucicarmine stain were prepared and the results support the given   diagnosis.     I have personally reviewed all specimens and/or slides, including the   listed special stains, and used them   with my medical judgement to determine or confirm the final diagnosis.     Electronically signed out by:     Mat Darnell M.D., PhD, Physicians       Assessment:    Heide Valdivia is a 29 year old woman with a new diagnosis of cervical dysplasia      Plan:     1.)   CIN3/SMILE of cervix:  Reviewed pathology. No SMILE in CKC specimen. Discussed surveillance options. I think given the fact the CKC specimen showed only CIN3 with negative margins, cytology at 12 and 24 months would be reasonable. Discussed risk of recurrence/skip lesions. Discussed importance of waiting ~1 year before getting pregnant to improve pregnancy outcomes.       - Followup cervical cytology with ECC in 12 and 24 months. OK if general OBGYN does this  - Recommend birth control (no pregnancy) for 12 months. She declines any OCPs today.         2.) Genetic  risk factors were assessed: No need for genetic testing or further evaluation at this time.       3.) Postop: Doing well.     Total time spent with face to face with the patient today was 20 minutes, 10 minutes was doing post op follow up. Greater than 50% of the remaining 10 minutes was spent counseling/or coordinating care with the patient on issues described above.        Korin Srivastava MD    Department of Ob/Gyn and Women's Health  Division of Gynecologic Oncology  [facility]  [phone]      References:    PMID: [id number]   PMID: [id number]

## 2020-10-07 ASSESSMENT — ENCOUNTER SYMPTOMS
MYALGIAS: 0
NERVOUS/ANXIOUS: 0
CONSTIPATION: 0
HEMATURIA: 0
HEMATOCHEZIA: 0
PARESTHESIAS: 0
DIARRHEA: 0
ABDOMINAL PAIN: 0
SHORTNESS OF BREATH: 0
ARTHRALGIAS: 0
COUGH: 0
CHILLS: 0
FEVER: 0
DIZZINESS: 0
PALPITATIONS: 0
BREAST MASS: 0
WEAKNESS: 0
HEARTBURN: 0
DYSURIA: 0
EYE PAIN: 0
SORE THROAT: 0
JOINT SWELLING: 0
NAUSEA: 0
HEADACHES: 0

## 2020-10-09 ENCOUNTER — OFFICE VISIT (OUTPATIENT)
Dept: FAMILY MEDICINE | Facility: CLINIC | Age: 30
End: 2020-10-09
Payer: COMMERCIAL

## 2020-10-09 VITALS
SYSTOLIC BLOOD PRESSURE: 102 MMHG | TEMPERATURE: 99 F | OXYGEN SATURATION: 98 % | RESPIRATION RATE: 16 BRPM | WEIGHT: 171.6 LBS | DIASTOLIC BLOOD PRESSURE: 70 MMHG | BODY MASS INDEX: 26.93 KG/M2 | HEART RATE: 73 BPM | HEIGHT: 67 IN

## 2020-10-09 DIAGNOSIS — Z00.00 ROUTINE GENERAL MEDICAL EXAMINATION AT A HEALTH CARE FACILITY: Primary | ICD-10-CM

## 2020-10-09 DIAGNOSIS — R87.619 ATYPICAL ENDOCERVICAL CELLS ON PAP SMEAR: ICD-10-CM

## 2020-10-09 PROCEDURE — 99395 PREV VISIT EST AGE 18-39: CPT | Performed by: FAMILY MEDICINE

## 2020-10-09 ASSESSMENT — MIFFLIN-ST. JEOR: SCORE: 1536

## 2020-10-09 NOTE — PROGRESS NOTES
SUBJECTIVE:   CC: Shonda Valdivia is an 30 year old woman who presents for preventive health visit.       Patient has been advised of split billing requirements and indicates understanding: Yes  Healthy Habits:     Getting at least 3 servings of Calcium per day:  Yes    Bi-annual eye exam:  NO    Dental care twice a year:  Yes    Sleep apnea or symptoms of sleep apnea:  None    Diet:  Regular (no restrictions)    Frequency of exercise:  2-3 days/week    Duration of exercise:  15-30 minutes    Taking medications regularly:  Not Applicable    PHQ-2 Total Score: 0    Additional concerns today:  Yes              Today's PHQ-2 Score:   PHQ-2 ( 1999 [employer]) 10/9/2020   Q1: Little interest or pleasure in doing things 0   Q2: Feeling down, depressed or hopeless 0   PHQ-2 Score 0   Q1: Little interest or pleasure in doing things -   Q2: Feeling down, depressed or hopeless -   PHQ-2 Score -       Abuse: Current or Past (Physical, Sexual or Emotional) - No  Do you feel safe in your environment? Yes        Social History     Tobacco Use     Smoking status: Never Smoker     Smokeless tobacco: Never Used   Substance Use Topics     Alcohol use: Yes     Comment: occ         Alcohol Use 10/7/2020   Prescreen: >3 drinks/day or >7 drinks/week? No   Prescreen: >3 drinks/day or >7 drinks/week? -       Reviewed orders with patient.  Reviewed health maintenance and updated orders accordingly - Yes      Mammogram not appropriate for this patient based on age.    Pertinent mammograms are reviewed under the imaging tab.  History of abnormal Pap smear: YES - updated in Problem List and Health Maintenance accordingly  PAP / HPV Latest Ref Rng & Units 8/2/2019 6/11/2018 3/13/2015   PAP - ATYP(A) ASC-US(A) NIL   HPV 16 DNA NEG:Negative Positive(A) Positive(A) -   HPV 18 DNA NEG:Negative Negative Negative -   OTHER HR HPV NEG:Negative Negative Negative -     Reviewed and updated as needed this visit by clinical staff  Tobacco  Allergies   Meds   Med Hx  Surg Hx  Fam Hx  Soc Hx        Patient Active Problem List    Diagnosis Date Noted     Cervix dysplasia 10/04/2019     Priority: Medium     Added automatically from request for surgery [id number]       Cervical high risk HPV (human papillomavirus) test positive 08/12/2019     Priority: Medium     Atypical endocervical cells on Pap smear 06/11/2018     Priority: Medium     6/11/2018 Pap: ASCUS, +HR HPV type 16 (not 18 or other). Plan [place]-  6/29/18 [place] Bx's & ECC - Negative. Plan cotest in 1 year.   8/2/19 ATYP pap - atypical endocervical cells, + HR HPV 16 (Neg 18 & other). Plan colp with ECC.   9/20/19 [place] Bx - Focal HSIL, URIEL 2-3 with focal mucinous, consistent with stratified mucin-producing intraepithelial lesion (SMILE). ECC & EMB - benign. Plan  CKC in the OR.        Contraception 09/07/2014     Priority: Medium     Implanon May 2011 - January 2012  Has used OCPs  Nuva Ring started October 2013       CARDIOVASCULAR SCREENING; LDL GOAL LESS THAN 160 12/04/2012     Priority: Medium     Dyspareunia 10/18/2011     Priority: Medium     Vaginal itching 10/18/2011     Priority: Medium     Other ankle sprain and strain 08/21/2007     Priority: Medium       Reviewed and updated as needed this visit by Provider                    Review of Systems  CONSTITUTIONAL: NEGATIVE for fever, chills, change in weight  INTEGUMENTARU/SKIN: NEGATIVE for worrisome rashes, moles or lesions  EYES: NEGATIVE for vision changes or irritation  ENT: NEGATIVE for ear, mouth and throat problems  RESP: NEGATIVE for significant cough or SOB  BREAST: NEGATIVE for masses, tenderness or discharge  CV: NEGATIVE for chest pain, palpitations or peripheral edema  GI: NEGATIVE for nausea, abdominal pain, heartburn, or change in bowel habits  : NEGATIVE for unusual urinary or vaginal symptoms. Periods are regular.  MUSCULOSKELETAL: NEGATIVE for significant arthralgias or myalgia  NEURO: NEGATIVE for weakness, dizziness or  "paresthesias  PSYCHIATRIC: NEGATIVE for changes in mood or affect     OBJECTIVE:   /70   Pulse 73   Temp 99  F (37.2  C) (Tympanic)   Resp 16   Ht 1.702 m (5' 7\")   Wt 77.8 kg (171 lb 9.6 oz)   SpO2 98%   BMI 26.88 kg/m    Physical Exam  GENERAL: healthy, alert and no distress  EYES: Eyes grossly normal to inspection, PERRL and conjunctivae and sclerae normal  HENT: ear canals and TM's normal, nose and mouth without ulcers or lesions  NECK: no adenopathy, no asymmetry, masses, or scars and thyroid normal to palpation  RESP: lungs clear to auscultation - no rales, rhonchi or wheezes  BREAST: normal without masses, tenderness or nipple discharge and no palpable axillary masses or adenopathy  CV: regular rate and rhythm, normal S1 S2, no S3 or S4, no murmur, click or rub, no peripheral edema and peripheral pulses strong  ABDOMEN: soft, nontender, no hepatosplenomegaly, no masses and bowel sounds normal  MS: no gross musculoskeletal defects noted, no edema  SKIN: no suspicious lesions or rashes  NEURO: Normal strength and tone, mentation intact and speech normal  PSYCH: mentation appears normal, affect normal/bright    Diagnostic Test Results:  Labs reviewed in Epic    ASSESSMENT/PLAN:   1. Routine general medical examination at a health care facility       2. Atypical endocervical cells on Pap smear  URIEL 3 with SMILE  Per GYN/ONC note 11/2019:  1.)   CIN3/SMILE of cervix:  Reviewed pathology. No SMILE in CKC specimen. Discussed surveillance options. I think given the fact the CKC specimen showed only CIN3 with negative margins, cytology at 12 and 24 months would be reasonable. Discussed risk of recurrence/skip lesions. Discussed importance of waiting ~1 year before getting pregnant to improve pregnancy outcomes.         - Followup cervical cytology with ECC in 12 and 24 months. OK if general OBGYN does this  - Recommend birth control (no pregnancy) for 12 months. She declines any OCPs today.        Patient " "needs OB/GYN apt for the pap/ECC.  We don't have the capability of ECC in primary care clinic as tools not available (nobody here doing colposcopy).   Patient understands need for follow up.     Patient has been advised of split billing requirements and indicates understanding: No  COUNSELING:  Reviewed preventive health counseling, as reflected in patient instructions       Regular exercise       Healthy diet/nutrition    Estimated body mass index is 26.88 kg/m  as calculated from the following:    Height as of this encounter: 1.702 m (5' 7\").    Weight as of this encounter: 77.8 kg (171 lb 9.6 oz).    Weight management plan: Discussed healthy diet and exercise guidelines    She reports that she has never smoked. She has never used smokeless tobacco.      Counseling Resources:  ATP IV Guidelines  Pooled Cohorts Equation Calculator  Breast Cancer Risk Calculator  BRCA-Related Cancer Risk Assessment: FHS-7 Tool  FRAX Risk Assessment  ICSI Preventive Guidelines  Dietary Guidelines for Americans, 2010  USDA's MyPlate  ASA Prophylaxis  Lung CA Screening    Zulay Mark MD  [facility]  "

## 2020-10-16 ENCOUNTER — OFFICE VISIT (OUTPATIENT)
Dept: OBGYN | Facility: CLINIC | Age: 30
End: 2020-10-16
Payer: COMMERCIAL

## 2020-10-16 VITALS
DIASTOLIC BLOOD PRESSURE: 88 MMHG | SYSTOLIC BLOOD PRESSURE: 138 MMHG | BODY MASS INDEX: 27.25 KG/M2 | HEART RATE: 85 BPM | WEIGHT: 174 LBS | TEMPERATURE: 98.1 F

## 2020-10-16 DIAGNOSIS — N87.9 CERVIX DYSPLASIA: Primary | ICD-10-CM

## 2020-10-16 DIAGNOSIS — Z01.812 PRE-PROCEDURE LAB EXAM: ICD-10-CM

## 2020-10-16 LAB — HCG UR QL: NEGATIVE

## 2020-10-16 PROCEDURE — 87624 HPV HI-RISK TYP POOLED RSLT: CPT | Performed by: OBSTETRICS & GYNECOLOGY

## 2020-10-16 PROCEDURE — 57505 ENDOCERVICAL CURETTAGE: CPT | Performed by: OBSTETRICS & GYNECOLOGY

## 2020-10-16 PROCEDURE — 81025 URINE PREGNANCY TEST: CPT | Performed by: OBSTETRICS & GYNECOLOGY

## 2020-10-16 PROCEDURE — 88305 TISSUE EXAM BY PATHOLOGIST: CPT | Performed by: PATHOLOGY

## 2020-10-16 NOTE — NURSING NOTE
"Initial /88 (BP Location: Left arm, Patient Position: Chair, Cuff Size: Adult Regular)   Pulse 85   Temp 98.1  F (36.7  C) (Tympanic)   Wt 78.9 kg (174 lb)   LMP 10/01/2020   Breastfeeding No   BMI 27.25 kg/m   Estimated body mass index is 27.25 kg/m  as calculated from the following:    Height as of 10/9/20: 1.702 m (5' 7\").    Weight as of this encounter: 78.9 kg (174 lb). .    Shaina Rees MA    "

## 2020-10-16 NOTE — PROGRESS NOTES
[facility]    Pap Smear and ECC Procedure Note      Shonda Valdivia  1990  [id number]    Indications:    Shonda Valdivia is a 30 year old year old female, who has a history of SMILE (stratified mucin producing intraepithelial lesion of the cervix), now s/p [facility] 10/2019. Plan per Dr. Srivastava (GYN ONC) was to complete repeat cervical cytology with ECC at 12 and 24 months post op. She is here today for her 12 month follow up. No complaints. The patient was counseled on the risks (including including risk of infection, bleeding, recurrence), benefits, and alternatives of the procedure.      Procedure:  Verbal and written consent were obtained. The patient was placed in the dorsal lithotomy position.  A speculum was placed in the vagina and the cervix visualized. Pap smear obtained. Endocervical curettage performed with Kevorkian curette and cytobrush. Excellent hemostasis observed.     Post Procedure:    IMPRESSION: Patient tolerated procedure well. No complications.     PLAN : Await the results of the cytology and biopsy. Patient was discharged in stable condition.    The patient was given post op instructions which included activity and pelvic restrictions.  She was advised to call the clinic if excessive bleeding, pelvic pain, or fever.     Follow-up based on pathology results.    Alyx Fong DO

## 2020-10-20 LAB
COPATH REPORT: NORMAL
COPATH REPORT: NORMAL
PAP: NORMAL

## 2020-10-21 ENCOUNTER — PATIENT OUTREACH (OUTPATIENT)
Dept: OBGYN | Facility: CLINIC | Age: 30
End: 2020-10-21

## 2020-10-21 DIAGNOSIS — R87.619 ATYPICAL ENDOCERVICAL CELLS ON PAP SMEAR: ICD-10-CM

## 2020-10-21 LAB
FINAL DIAGNOSIS: NORMAL
HPV HR 12 DNA CVX QL NAA+PROBE: NEGATIVE
HPV16 DNA SPEC QL NAA+PROBE: NEGATIVE
HPV18 DNA SPEC QL NAA+PROBE: NEGATIVE
SPECIMEN DESCRIPTION: NORMAL
SPECIMEN SOURCE CVX/VAG CYTO: NORMAL

## 2020-10-22 NOTE — TELEPHONE ENCOUNTER
6/11/2018 Pap: ASCUS, +HR HPV type 16 (not 18 or other). Plan [place]-  6/29/18 [place] Bx's & ECC - Negative. Plan cotest in 1 year.   8/2/19 ATYP pap - atypical endocervical cells, + HR HPV 16 (Neg 18 & other). Plan colp with ECC.   9/20/19 [place] Bx - Focal HSIL, URIEL 2-3 with focal mucinous, consistent with stratified mucin-producing intraepithelial lesion (SMILE). ECC & EMB - benign. Plan  CKC in the OR.   10/16/20 ECC- negative for dysplasia. NIL pap , Neg HPV done at time of ECC. Plan cotest and ECC in one year due bef 10/16/21.

## 2020-11-16 ENCOUNTER — HEALTH MAINTENANCE LETTER (OUTPATIENT)
Age: 30
End: 2020-11-16

## 2021-02-22 ENCOUNTER — VIRTUAL VISIT (OUTPATIENT)
Dept: FAMILY MEDICINE | Facility: CLINIC | Age: 31
End: 2021-02-22
Payer: COMMERCIAL

## 2021-02-22 DIAGNOSIS — Z30.011 INITIATION OF ORAL CONTRACEPTION: Primary | ICD-10-CM

## 2021-02-22 PROCEDURE — 99213 OFFICE O/P EST LOW 20 MIN: CPT | Mod: GT | Performed by: FAMILY MEDICINE

## 2021-02-22 RX ORDER — DROSPIRENONE AND ETHINYL ESTRADIOL 0.02-3(28)
1 KIT ORAL DAILY
Qty: 84 TABLET | Refills: 3 | Status: SHIPPED | OUTPATIENT
Start: 2021-02-22 | End: 2021-05-10

## 2021-02-22 NOTE — PATIENT INSTRUCTIONS
Health Maintenance reviewed and plan for update discussed.  Patient asked to schedule her pap smear in October    Our Clinic hours are:  Mondays    7:20 am - 7 pm  Tues -  Fri  7:20 am - 5 pm    Clinic Phone: [phone]    The clinic lab opens at 7:30 am Mon - Fri and appointments are required.    [facility]  Ph. [phone]  Monday  8 am - 7pm  Tues - Fri 8 am - 5:30 pm

## 2021-02-22 NOTE — PROGRESS NOTES
Shonda is a 30 year old who is being evaluated via a billable video visit.      How would you like to obtain your AVS? MyChart  If the video visit is dropped, the invitation should be resent by: Send to e-mail at: [email or web address]  Will anyone else be joining your video visit? No      Video Start Time: 12:41 PM    Assessment & Plan     Initiation of oral contraception  Discussed multiple contraceptive options including the risks and bennefits of each choice.    The patient has chosen ocp.  We have discussed the appropriate use and questions answered.  Return if any problems tolerating.    The use of the oral contraceptive has been fully discussed with the patient.   The patient has been told of the more serious potential side effects such as MI, stroke, and deep vein thrombosis, all of which are very unlikely.  She has been asked to report any signs of such serious problems immediately.  She should back up the pill with a condom during the first cycle.  She has been given written literature regarding use and side effects of oral contraceptives. The need for additional protection, such as a condom, to prevent exposure to sexually transmitted diseases has also been discussed- the patient has been clearly reminded that OCP's cannot protect her against diseases such as HIV and others. She understands and wishes to take the medication as prescribed.        - drospirenone-ethinyl estradiol (YESSICA) 3-0.02 MG tablet; Take 1 tablet by mouth daily                 No follow-ups on file.    Zulay Mark MD  [facility]   Shonda is a 30 year old who presents for the following health issues     HPI       Concern - Would like to discuss starting birth control.   Onset: Patient hasn't had any contraception for 8 years  Progression of Symptoms:  Cramping and wanting to skip periods  Accompanying Signs & Symptoms: cramping  Previous history of similar problem: Patient had no sex drive  and moodiness           Therapies tried and outcome: nexaplon, nuvring, oral contraceptions.     Struggled with moodiness in the past on hormone containing oral contraceptive pills.  Will try Nika to start with.  Also discussed other options, Mirena IUD may be a good option if moods are an issue or a low dose fluoxetine 10 mg.       Review of Systems   Constitutional, HEENT, cardiovascular, pulmonary, gi and gu systems are negative, except as otherwise noted.      Objective           Vitals:  No vitals were obtained today due to virtual visit.    Physical Exam   GENERAL: Healthy, alert and no distress  EYES: Eyes grossly normal to inspection.  No discharge or erythema, or obvious scleral/conjunctival abnormalities.  RESP: No audible wheeze, cough, or visible cyanosis.  No visible retractions or increased work of breathing.    SKIN: Visible skin clear. No significant rash, abnormal pigmentation or lesions.  NEURO: Cranial nerves grossly intact.  Mentation and speech appropriate for age.  PSYCH: Mentation appears normal, affect normal/bright, judgement and insight intact, normal speech and appearance well-groomed.                Video-Visit Details    Type of service:  Video Visit    Video End Time:12:48 PM    Originating Location (pt. Location): Home    Distant Location (provider location):  [facility]     Platform used for Video Visit: [employer]

## 2021-05-10 ENCOUNTER — OFFICE VISIT (OUTPATIENT)
Dept: FAMILY MEDICINE | Facility: CLINIC | Age: 31
End: 2021-05-10
Payer: COMMERCIAL

## 2021-05-10 VITALS
HEART RATE: 98 BPM | TEMPERATURE: 98.3 F | OXYGEN SATURATION: 98 % | HEIGHT: 67 IN | DIASTOLIC BLOOD PRESSURE: 62 MMHG | SYSTOLIC BLOOD PRESSURE: 132 MMHG | RESPIRATION RATE: 16 BRPM | WEIGHT: 174 LBS | BODY MASS INDEX: 27.31 KG/M2

## 2021-05-10 DIAGNOSIS — B96.89 BV (BACTERIAL VAGINOSIS): ICD-10-CM

## 2021-05-10 DIAGNOSIS — N76.0 BV (BACTERIAL VAGINOSIS): ICD-10-CM

## 2021-05-10 DIAGNOSIS — N89.8 VAGINAL DISCHARGE: Primary | ICD-10-CM

## 2021-05-10 LAB
SPECIMEN SOURCE: ABNORMAL
WET PREP SPEC: ABNORMAL

## 2021-05-10 PROCEDURE — 87210 SMEAR WET MOUNT SALINE/INK: CPT | Performed by: NURSE PRACTITIONER

## 2021-05-10 PROCEDURE — 99213 OFFICE O/P EST LOW 20 MIN: CPT | Performed by: NURSE PRACTITIONER

## 2021-05-10 RX ORDER — METRONIDAZOLE 500 MG/1
500 TABLET ORAL 2 TIMES DAILY
Qty: 14 TABLET | Refills: 0 | Status: CANCELLED | OUTPATIENT
Start: 2021-05-10 | End: 2021-05-17

## 2021-05-10 RX ORDER — FLUCONAZOLE 150 MG/1
150 TABLET ORAL ONCE
Qty: 1 TABLET | Refills: 0 | Status: SHIPPED | OUTPATIENT
Start: 2021-05-10 | End: 2021-05-10

## 2021-05-10 RX ORDER — METRONIDAZOLE 7.5 MG/G
1 GEL VAGINAL DAILY
Qty: 25 G | Refills: 0 | Status: SHIPPED | OUTPATIENT
Start: 2021-05-10 | End: 2022-01-13

## 2021-05-10 ASSESSMENT — MIFFLIN-ST. JEOR: SCORE: 1533.95

## 2021-05-10 NOTE — PROGRESS NOTES
"    Assessment & Plan       ICD-10-CM    1. Vaginal discharge  N89.8 Wet prep     fluconazole (DIFLUCAN) 150 MG tablet   2. BV (bacterial vaginosis)  N76.0 metroNIDAZOLE (METROGEL) 0.75 % vaginal gel    B96.89      Will treat bacterial vaginosis with vaginal metronidazole.  Provided patient with a tablet of Diflucan in case vaginal yeast infection occurs secondary to antibiotic use.  Discussed risks and benefits of occasions and potential side effects.  Also discussed with patient that with her next menstrual cycle symptoms should improve.  Follow-up as needed.          BMI:   Estimated body mass index is 27.66 kg/m  as calculated from the following:    Height as of this encounter: 1.689 m (5' 6.5\").    Weight as of this encounter: 78.9 kg (174 lb).           Return in about 2 weeks (around 5/24/2021) for ongoing symptoms if not improving.    Jazz Marsh, CATIA CNP  [facility]   Shonda is a 31 year old who presents for the following health issues  accompanied by her self :    HPI     Vaginal Symptoms  Onset/Duration:4/2/21  Description:  Vaginal Discharge: white   Itching (Pruritis): YES  Burning sensation:  no  Odor: no  Accompanying Signs & Symptoms:  Urinary symptoms: no  Abdominal pain: no  Fever: no  History:   Sexually active: YES-   New Partner: YES, recently stopped using condoms with partner  Possibility of Pregnancy:  no  Recent antibiotic use: YES  Previous vaginitis issues: YES  Precipitating or alleviating factors: None  Therapies tried and outcome: Monistat  - no much relief, history of BV, yeast in the past    Patient states symptoms have been present for 4 days. She has a history of BV in the past with a yeast infection and thrush following treatment.       Review of Systems   Constitutional, HEENT, cardiovascular, pulmonary, gi and gu systems are negative, except as otherwise noted.      Objective    /62 (BP Location: Right arm, Patient Position: " "Chair, Cuff Size: Adult Large)   Pulse 98   Temp 98.3  F (36.8  C)   Resp 16   Ht 1.689 m (5' 6.5\")   Wt 78.9 kg (174 lb)   SpO2 98%   BMI 27.66 kg/m    Body mass index is 27.66 kg/m .     Physical Exam   GENERAL: healthy, alert and no distress  PSYCH: mentation appears normal, affect normal/bright    Results for orders placed or performed in visit on 05/10/21   Wet prep     Status: Abnormal    Specimen: Vagina   Result Value Ref Range    Specimen Description Vagina     Wet Prep No yeast seen     Wet Prep No Trichomonas seen     Wet Prep Moderate  Clue cells seen   (A)     Wet Prep Few  WBC'S seen                  "

## 2021-05-10 NOTE — PATIENT INSTRUCTIONS
Our Clinic hours are:  Mondays    7:20 am - 7 pm  Tues -  Fri  7:20 am - 5 pm    Clinic Phone: [phone]    The clinic lab opens at 7:30 am Mon - Fri and appointments are required.    [facility]. [phone]  Monday  8 am - 7pm  Tues - Fri 8 am - 5:30 pm

## 2021-09-12 ENCOUNTER — HEALTH MAINTENANCE LETTER (OUTPATIENT)
Age: 31
End: 2021-09-12

## 2021-09-30 ENCOUNTER — PATIENT OUTREACH (OUTPATIENT)
Dept: OBGYN | Facility: CLINIC | Age: 31
End: 2021-09-30
Payer: COMMERCIAL

## 2021-09-30 DIAGNOSIS — R87.619 ATYPICAL ENDOCERVICAL CELLS ON PAP SMEAR: ICD-10-CM

## 2022-01-02 ENCOUNTER — HEALTH MAINTENANCE LETTER (OUTPATIENT)
Age: 32
End: 2022-01-02

## 2022-01-13 ENCOUNTER — OFFICE VISIT (OUTPATIENT)
Dept: OBGYN | Facility: CLINIC | Age: 32
End: 2022-01-13
Payer: COMMERCIAL

## 2022-01-13 VITALS
RESPIRATION RATE: 16 BRPM | BODY MASS INDEX: 28.22 KG/M2 | WEIGHT: 179.8 LBS | HEIGHT: 67 IN | TEMPERATURE: 99.3 F | HEART RATE: 74 BPM | SYSTOLIC BLOOD PRESSURE: 137 MMHG | DIASTOLIC BLOOD PRESSURE: 79 MMHG

## 2022-01-13 DIAGNOSIS — Z00.00 ROUTINE GENERAL MEDICAL EXAMINATION AT A HEALTH CARE FACILITY: Primary | ICD-10-CM

## 2022-01-13 DIAGNOSIS — D06.9 CIN III WITH SEVERE DYSPLASIA: ICD-10-CM

## 2022-01-13 PROBLEM — N87.9 CERVIX DYSPLASIA: Status: RESOLVED | Noted: 2019-10-04 | Resolved: 2022-01-13

## 2022-01-13 PROCEDURE — 87624 HPV HI-RISK TYP POOLED RSLT: CPT | Performed by: OBSTETRICS & GYNECOLOGY

## 2022-01-13 PROCEDURE — 99395 PREV VISIT EST AGE 18-39: CPT | Mod: 25 | Performed by: OBSTETRICS & GYNECOLOGY

## 2022-01-13 PROCEDURE — G0145 SCR C/V CYTO,THINLAYER,RESCR: HCPCS | Performed by: OBSTETRICS & GYNECOLOGY

## 2022-01-13 PROCEDURE — 57505 ENDOCERVICAL CURETTAGE: CPT | Performed by: OBSTETRICS & GYNECOLOGY

## 2022-01-13 PROCEDURE — 88305 TISSUE EXAM BY PATHOLOGIST: CPT | Performed by: PATHOLOGY

## 2022-01-13 ASSESSMENT — MIFFLIN-ST. JEOR: SCORE: 1560.26

## 2022-01-13 NOTE — PROGRESS NOTES
SUBJECTIVE:   CC: Shonda Valdivia is an 31 year old woman who presents for preventive health visit.       Patient has been advised of split billing requirements and indicates understanding: Yes  Healthy Habits:  Answers for HPI/ROS submitted by the patient on 1/12/2022  Frequency of exercise:: None  Getting at least 3 servings of Calcium per day:: Yes  Diet:: Regular (no restrictions)  Taking medications regularly:: Not Applicable  Medication side effects:: Not applicable  Bi-annual eye exam:: NO  Dental care twice a year:: Yes  Sleep apnea or symptoms of sleep apnea:: None  Additional concerns today:: No        Today's PHQ-2 Score:   PHQ-2 ( 1999 Pfizer) 1/12/2022 2/22/2021   Q1: Little interest or pleasure in doing things 0 0   Q2: Feeling down, depressed or hopeless 0 0   PHQ-2 Score 0 0   PHQ-2 Total Score (12-17 Years)- Positive if 3 or more points; Administer PHQ-A if positive - 0   Q1: Little interest or pleasure in doing things Not at all -   Q2: Feeling down, depressed or hopeless Not at all -   PHQ-2 Score 0 -       Abuse: Current or Past(Physical, Sexual or Emotional)- No  Do you feel safe in your environment? Yes    Have you ever done Advance Care Planning? (For example, a Health Directive, POLST, or a discussion with a medical provider or your loved ones about your wishes): No, advance care planning information given to patient to review.  Patient declined advance care planning discussion at this time.    Social History     Tobacco Use     Smoking status: Never Smoker     Smokeless tobacco: Never Used   Substance Use Topics     Alcohol use: Yes     Comment: occ     If you drink alcohol do you typically have >3 drinks per day or >7 drinks per week? No                     Reviewed orders with patient.  Reviewed health maintenance and updated orders accordingly - Yes  BP Readings from Last 3 Encounters:   01/13/22 137/79   05/10/21 132/62   10/16/20 138/88    Wt Readings from Last 3 Encounters:    01/13/22 81.6 kg (179 lb 12.8 oz)   05/10/21 78.9 kg (174 lb)   10/16/20 78.9 kg (174 lb)                    FHS-7: No flowsheet data found.    Patient under 40 years of age: Routine Mammogram Screening not recommended.   Pertinent mammograms are reviewed under the imaging tab.    Pertinent mammograms are reviewed under the imaging tab.  History of abnormal Pap smear: YES - URIEL 2/3 on biopsy - PAP/HPV co-testing at 12, 24 months.  If two negative results repeat co-testing in 3 years, if negative then routine screening.  PAP / HPV Latest Ref Rng & Units 10/16/2020 8/2/2019 6/11/2018   PAP (Historical) - NIL ATYP(A) ASC-US(A)   HPV16 NEG:Negative Negative Positive(A) Positive(A)   HPV18 NEG:Negative Negative Negative Negative   HRHPV NEG:Negative Negative Negative Negative     Reviewed and updated as needed this visit by clinical staff  Tobacco  Allergies  Meds   Med Hx  Surg Hx  Fam Hx  Soc Hx       Reviewed and updated as needed this visit by Provider               Past Medical History:   Diagnosis Date     Abnormal Pap smear of cervix 06/11/2018, 2019    see Problem List     Cervical high risk HPV (human papillomavirus) test positive 06/11/2018, 2019    See Problem List     Chlamydia 2009     URIEL III with severe dysplasia 09/20/2019    see problem list     Implantable subdermal contraceptive surveillance 9/7/2014    Placed at [facility] 5- -  Removed January 2012       Other ankle sprain and strain 8/21/2007      Past Surgical History:   Procedure Laterality Date     APPENDECTOMY  ?2005    open     COLPOSCOPY, CONIZATION, COMBINED N/A 10/18/2019    Procedure: COLPOSCOPY, CERVICAL Conization;  Surgeon: Korin Srivastava MD;  Location:  OR       ROS:  CONSTITUTIONAL: NEGATIVE for fever, chills, change in weight  INTEGUMENTARU/SKIN: NEGATIVE for worrisome rashes, moles or lesions  EYES: NEGATIVE for vision changes or irritation  ENT: NEGATIVE for ear, mouth and throat problems  RESP: NEGATIVE for  "significant cough or SOB  BREAST: NEGATIVE for masses, tenderness or discharge  CV: NEGATIVE for chest pain, palpitations or peripheral edema  GI: NEGATIVE for nausea, abdominal pain, heartburn, or change in bowel habits   female: no unusual urinary symptoms and no unusual vaginal symptoms  MUSCULOSKELETAL: NEGATIVE for significant arthralgias or myalgia  NEURO: NEGATIVE for weakness, dizziness or paresthesias  PSYCHIATRIC: NEGATIVE for changes in mood or affect    OBJECTIVE:   /79 (BP Location: Right arm, Patient Position: Chair, Cuff Size: Adult Regular)   Pulse 74   Temp 99.3  F (37.4  C) (Tympanic)   Resp 16   Ht 1.689 m (5' 6.5\")   Wt 81.6 kg (179 lb 12.8 oz)   LMP 12/11/2021   BMI 28.59 kg/m    EXAM:  GENERAL: healthy, alert and no distress  EYES: Eyes grossly normal to inspection  NECK: no adenopathy, no asymmetry, masses, or scars and thyroid normal to palpation  RESP: lungs clear to auscultation - no rales, rhonchi or wheezes  BREAST: normal without masses, tenderness or nipple discharge and no palpable axillary masses or adenopathy  CV: regular rate and rhythm, normal S1 S2, no S3 or S4, no murmur, click or rub, no peripheral edema and peripheral pulses strong  ABDOMEN: soft, nontender, no hepatosplenomegaly, no masses and bowel sounds normal   (female): normal female external genitalia, normal urethral meatus, vaginal mucosa pink, moist, well rugated, and normal adnexa/uterus without masses or discharge, cervix s/p LEEP (well healed and normal in appearance)    Pap performed and endocervical curettage performed without difficulty.  Os patent.     MS: no gross musculoskeletal defects noted, no edema  SKIN: no suspicious lesions or rashes  NEURO: Normal strength and tone, mentation intact and speech normal  PSYCH: mentation appears normal, affect normal/bright    Diagnostic Test Results:  Labs reviewed in Epic    ASSESSMENT/PLAN:   (Z00.00) Routine general medical examination at [facility]" "care facility  (primary encounter diagnosis)  Comment: Routine health maintenance reviewed.  Pap and ECC done today.  Reviewed pre-conception health and monitoring of cycles    (D06.9) URIEL III with severe dysplasia  Plan: ENDOCERVICAL CURETTAGE, Surgical Pathology Exam            Patient has been advised of split billing requirements and indicates understanding: Yes  COUNSELING:   Reviewed preventive health counseling, as reflected in patient instructions  Special attention given to:        Regular exercise       Healthy diet/nutrition       Vision screening       Alcohol Use       Contraception       Family planning       Folic Acid       Osteoporosis prevention/bone health       Safe sex practices/STD prevention    Estimated body mass index is 27.66 kg/m  as calculated from the following:    Height as of 5/10/21: 1.689 m (5' 6.5\").    Weight as of 5/10/21: 78.9 kg (174 lb).    Weight management plan: Discussed healthy diet and exercise guidelines    She reports that she has never smoked. She has never used smokeless tobacco.      Counseling Resources:  ATP IV Guidelines  Pooled Cohorts Equation Calculator  Breast Cancer Risk Calculator  BRCA-Related Cancer Risk Assessment: FHS-7 Tool  FRAX Risk Assessment  ICSI Preventive Guidelines  Dietary Guidelines for Americans, 2010  USDA's MyPlate  ASA Prophylaxis  Lung CA Screening    Gale Colunga MD  [facility]    "

## 2022-01-14 ENCOUNTER — PATIENT OUTREACH (OUTPATIENT)
Dept: OBGYN | Facility: CLINIC | Age: 32
End: 2022-01-14
Payer: COMMERCIAL

## 2022-01-14 DIAGNOSIS — D06.9 CIN III WITH SEVERE DYSPLASIA: ICD-10-CM

## 2022-01-14 LAB
PATH REPORT.COMMENTS IMP SPEC: NORMAL
PATH REPORT.COMMENTS IMP SPEC: NORMAL
PATH REPORT.FINAL DX SPEC: NORMAL
PATH REPORT.GROSS SPEC: NORMAL
PATH REPORT.MICROSCOPIC SPEC OTHER STN: NORMAL
PATH REPORT.RELEVANT HX SPEC: NORMAL
PHOTO IMAGE: NORMAL

## 2022-01-17 LAB
BKR LAB AP GYN ADEQUACY: NORMAL
BKR LAB AP GYN INTERPRETATION: NORMAL
BKR LAB AP HPV REFLEX: NORMAL
BKR LAB AP PREVIOUS ABNORMAL: NORMAL
PATH REPORT.COMMENTS IMP SPEC: NORMAL
PATH REPORT.COMMENTS IMP SPEC: NORMAL
PATH REPORT.RELEVANT HX SPEC: NORMAL

## 2022-01-19 LAB
HUMAN PAPILLOMA VIRUS 16 DNA: NEGATIVE
HUMAN PAPILLOMA VIRUS 18 DNA: NEGATIVE
HUMAN PAPILLOMA VIRUS FINAL DIAGNOSIS: NORMAL
HUMAN PAPILLOMA VIRUS OTHER HR: NEGATIVE

## 2022-06-14 ENCOUNTER — OFFICE VISIT (OUTPATIENT)
Dept: FAMILY MEDICINE | Facility: CLINIC | Age: 32
End: 2022-06-14
Payer: COMMERCIAL

## 2022-06-14 VITALS
SYSTOLIC BLOOD PRESSURE: 120 MMHG | RESPIRATION RATE: 18 BRPM | DIASTOLIC BLOOD PRESSURE: 72 MMHG | HEART RATE: 64 BPM | BODY MASS INDEX: 26.68 KG/M2 | TEMPERATURE: 99 F | WEIGHT: 170 LBS | OXYGEN SATURATION: 98 % | HEIGHT: 67 IN

## 2022-06-14 DIAGNOSIS — F43.22 ADJUSTMENT REACTION WITH ANXIETY: Primary | ICD-10-CM

## 2022-06-14 PROCEDURE — 99214 OFFICE O/P EST MOD 30 MIN: CPT | Performed by: FAMILY MEDICINE

## 2022-06-14 RX ORDER — ALPRAZOLAM 0.25 MG
.25-.5 TABLET ORAL 3 TIMES DAILY PRN
Qty: 20 TABLET | Refills: 0 | Status: SHIPPED | OUTPATIENT
Start: 2022-06-14 | End: 2024-01-30

## 2022-06-14 RX ORDER — HYDROXYZINE HYDROCHLORIDE 25 MG/1
25-50 TABLET, FILM COATED ORAL 3 TIMES DAILY PRN
Qty: 60 TABLET | Refills: 3 | Status: SHIPPED | OUTPATIENT
Start: 2022-06-14 | End: 2024-01-30

## 2022-06-14 RX ORDER — DICLOFENAC SODIUM 75 MG/1
75 TABLET, DELAYED RELEASE ORAL
COMMUNITY
Start: 2022-04-02 | End: 2024-01-30

## 2022-06-14 RX ORDER — CYCLOBENZAPRINE HCL 5 MG
5 TABLET ORAL
COMMUNITY
Start: 2022-04-02 | End: 2024-01-30

## 2022-06-14 ASSESSMENT — ANXIETY QUESTIONNAIRES
GAD7 TOTAL SCORE: 14
4. TROUBLE RELAXING: NEARLY EVERY DAY
7. FEELING AFRAID AS IF SOMETHING AWFUL MIGHT HAPPEN: NOT AT ALL
1. FEELING NERVOUS, ANXIOUS, OR ON EDGE: NEARLY EVERY DAY
5. BEING SO RESTLESS THAT IT IS HARD TO SIT STILL: NOT AT ALL
GAD7 TOTAL SCORE: 14
GAD7 TOTAL SCORE: 14
6. BECOMING EASILY ANNOYED OR IRRITABLE: MORE THAN HALF THE DAYS
2. NOT BEING ABLE TO STOP OR CONTROL WORRYING: NEARLY EVERY DAY
8. IF YOU CHECKED OFF ANY PROBLEMS, HOW DIFFICULT HAVE THESE MADE IT FOR YOU TO DO YOUR WORK, TAKE CARE OF THINGS AT HOME, OR GET ALONG WITH OTHER PEOPLE?: VERY DIFFICULT
7. FEELING AFRAID AS IF SOMETHING AWFUL MIGHT HAPPEN: NOT AT ALL
3. WORRYING TOO MUCH ABOUT DIFFERENT THINGS: NEARLY EVERY DAY

## 2022-06-14 ASSESSMENT — ENCOUNTER SYMPTOMS: NERVOUS/ANXIOUS: 1

## 2022-06-14 ASSESSMENT — PATIENT HEALTH QUESTIONNAIRE - PHQ9
SUM OF ALL RESPONSES TO PHQ QUESTIONS 1-9: 10
10. IF YOU CHECKED OFF ANY PROBLEMS, HOW DIFFICULT HAVE THESE PROBLEMS MADE IT FOR YOU TO DO YOUR WORK, TAKE CARE OF THINGS AT HOME, OR GET ALONG WITH OTHER PEOPLE: VERY DIFFICULT
SUM OF ALL RESPONSES TO PHQ QUESTIONS 1-9: 10

## 2022-06-14 ASSESSMENT — PAIN SCALES - GENERAL: PAINLEVEL: NO PAIN (0)

## 2022-06-14 NOTE — PROGRESS NOTES
Assessment & Plan     Adjustment reaction with anxiety  Discussed coping strategies  Has had minimal past treatment for anxiety, I strongly encouraged counseling and establishing a longer term relationship with a therapist for long-term outcomes, she is agreeable to this  Due to patient's history of this happening many times with similar triggering events, I felt it appropriate to rely on hydroxyzine and give a small amount of Xanax.  I also did not feel that lab work was indicated at this time with such a clear history.  Patient agreeable, understands to come back if symptoms persist without explanation  I discussed harms/risks of benzodiazepines.  No further refill of Xanax without an appointment  - hydrOXYzine (ATARAX) 25 MG tablet  Dispense: 60 tablet; Refill: 3  - ALPRAZolam (XANAX) 0.25 MG tablet  Dispense: 20 tablet; Refill: 0  - Adult Mental Health  Referral    PHQ 6/14/2022   PHQ-9 Total Score 10   Q9: Thoughts of better off dead/self-harm past 2 weeks Not at all     Patient Instructions   [employer] [facility] [employer] directory of therapists    Return if symptoms worsen or fail to improve.    Ranjana Vidal MD  [facility]            Ximena Merchant is a 32 year old who presents for the following health issues  accompanied by her self.    Anxiety    History of Present Illness       Mental Health Follow-up:  Patient presents to follow-up on Anxiety.    Patient's anxiety since last visit has been:  No change  The patient is having other symptoms associated with anxiety.  Any significant life events: relationship concerns  Patient is feeling anxious or having panic attacks.  Patient has no concerns about alcohol or drug use.    She eats 2-3 servings of fruits and vegetables daily.She consumes 1 sweetened beverage(s) daily.She exercises with enough effort to increase her heart rate 9 or less minutes per day.  She exercises with enough effort to increase her heart rate 3 or  less days per week.   She is taking medications regularly.    Today's PHQ-9         PHQ-9 Total Score: 10    PHQ-9 Q9 Thoughts of better off dead/self-harm past 2 weeks :   Not at all    How difficult have these problems made it for you to do your work, take care of things at home, or get along with other people: Very difficult  Today's ANA-7 Score: 14     Abnormal Mood Symptoms  Onset/Duration: 6-11-22  Description: Patient is having trouble with anxiety since 6-11-22 when patient got engaged it increases at night, she has had in the past with big life changes   Depression (if yes, do PHQ-9): YES  Anxiety (if yes, do ANA-7): YES  Accompanying Signs & Symptoms:  Still participating in activities that you used to enjoy: YES  Fatigue: no  Irritability: YES  Difficulty concentrating: no  Changes in appetite: YES  Problems with sleep: YES  Heart racing/beating fast: no  Abnormally elevated, expansive, or irritable mood: YES  Persistently increased activity or energy: no  Thoughts of hurting yourself or others: no  History:  Recent stress or major life event: YES - patient got engaged  Prior depression or anxiety: yes when rowdy moved in   Family history of depression or anxiety: no  Alcohol/drug use: no  Difficulty sleeping: YES  Precipitating or alleviating factors: life changes worse at night   Therapies tried and outcome: none  PHQ 5/15/2017 8/2/2019 6/14/2022   PHQ-9 Total Score 14 0 10   Q9: Thoughts of better off dead/self-harm past 2 weeks Not at all Not at all Not at all     ANA-7 SCORE 5/15/2017 6/14/2022   Total Score - 14 (moderate anxiety)   Total Score 13 14     She has lost appetite, will vomit sometimes, can't sleep  She did start talking with a counselor at the time that she had her prior episode, so then it seemed like not much to talk about  It lasted about 2 weeks that time    Review of Systems   Psychiatric/Behavioral: The patient is nervous/anxious.    Constitutional, HEENT, cardiovascular,  "pulmonary, gi and gu systems are negative, except as otherwise noted.      Objective    /72   Pulse 64   Temp 99  F (37.2  C) (Tympanic)   Resp 18   Ht 1.702 m (5' 7\")   Wt 77.1 kg (170 lb)   LMP 05/25/2022 (Exact Date)   SpO2 98%   BMI 26.63 kg/m    Body mass index is 26.63 kg/m .  Physical Exam   GENERAL: healthy, alert and no distress  RESP: normal respiratory effort, speaking in complete sentences  MS: no gross musculoskeletal defects noted, no edema  PSYCH: mentation appears normal, affect normal/bright  NEURO: grossly normal            "

## 2022-11-08 ENCOUNTER — OFFICE VISIT (OUTPATIENT)
Dept: OBGYN | Facility: CLINIC | Age: 32
End: 2022-11-08
Payer: COMMERCIAL

## 2022-11-08 VITALS
RESPIRATION RATE: 16 BRPM | BODY MASS INDEX: 28.09 KG/M2 | SYSTOLIC BLOOD PRESSURE: 116 MMHG | TEMPERATURE: 98.7 F | HEIGHT: 67 IN | HEART RATE: 66 BPM | WEIGHT: 179 LBS | DIASTOLIC BLOOD PRESSURE: 76 MMHG

## 2022-11-08 DIAGNOSIS — N94.6 DYSMENORRHEA: Primary | ICD-10-CM

## 2022-11-08 DIAGNOSIS — N92.0 MENORRHAGIA WITH REGULAR CYCLE: ICD-10-CM

## 2022-11-08 DIAGNOSIS — D06.9 CIN III WITH SEVERE DYSPLASIA: ICD-10-CM

## 2022-11-08 PROCEDURE — 99214 OFFICE O/P EST MOD 30 MIN: CPT | Performed by: OBSTETRICS & GYNECOLOGY

## 2022-11-08 RX ORDER — TRANEXAMIC ACID 650 MG/1
1300 TABLET ORAL 2 TIMES DAILY PRN
Qty: 12 TABLET | Refills: 6 | Status: SHIPPED | OUTPATIENT
Start: 2022-11-08 | End: 2022-11-11

## 2022-11-08 NOTE — NURSING NOTE
"Initial /76 (BP Location: Left arm, Patient Position: Chair, Cuff Size: Adult Regular)   Pulse 66   Temp 98.7  F (37.1  C) (Tympanic)   Resp 16   Ht 1.689 m (5' 6.5\")   Wt 81.2 kg (179 lb)   LMP 10/31/2022   BMI 28.46 kg/m   Estimated body mass index is 28.46 kg/m  as calculated from the following:    Height as of this encounter: 1.689 m (5' 6.5\").    Weight as of this encounter: 81.2 kg (179 lb). .      "

## 2022-11-09 NOTE — TELEPHONE ENCOUNTER
11/9/22 staff message    Alyx Fong DO  [facility] Pap Pool  Hello,     I would like to request a change in pap screening for this patient. She has a history of CIN3 and SMILE lesion. Recommended follow up per GYN ONC was co testing pap with ECC at 12 and 24 months after treatment. This has been completed and normal/negative. OK for follow up in 3 years for co testing. Continue q3 year co-testing for at least 25 years due to history of CIN3.     Thank you,   Alyx Fong DO

## 2022-11-19 ENCOUNTER — HEALTH MAINTENANCE LETTER (OUTPATIENT)
Age: 32
End: 2022-11-19

## 2023-04-09 ENCOUNTER — HEALTH MAINTENANCE LETTER (OUTPATIENT)
Age: 33
End: 2023-04-09

## 2024-01-30 ENCOUNTER — OFFICE VISIT (OUTPATIENT)
Dept: FAMILY MEDICINE | Facility: CLINIC | Age: 34
End: 2024-01-30
Payer: COMMERCIAL

## 2024-01-30 ENCOUNTER — LAB (OUTPATIENT)
Dept: LAB | Facility: CLINIC | Age: 34
End: 2024-01-30
Payer: COMMERCIAL

## 2024-01-30 VITALS
DIASTOLIC BLOOD PRESSURE: 64 MMHG | WEIGHT: 181.8 LBS | SYSTOLIC BLOOD PRESSURE: 118 MMHG | RESPIRATION RATE: 16 BRPM | HEART RATE: 57 BPM | OXYGEN SATURATION: 98 % | BODY MASS INDEX: 28.53 KG/M2 | HEIGHT: 67 IN

## 2024-01-30 DIAGNOSIS — K21.00 GASTROESOPHAGEAL REFLUX DISEASE WITH ESOPHAGITIS WITHOUT HEMORRHAGE: Primary | ICD-10-CM

## 2024-01-30 DIAGNOSIS — K21.00 GASTROESOPHAGEAL REFLUX DISEASE WITH ESOPHAGITIS WITHOUT HEMORRHAGE: ICD-10-CM

## 2024-01-30 DIAGNOSIS — R09.A2 GLOBUS SENSATION: ICD-10-CM

## 2024-01-30 LAB
ALBUMIN SERPL BCG-MCNC: 4.5 G/DL (ref 3.5–5.2)
ALP SERPL-CCNC: 67 U/L (ref 40–150)
ALT SERPL W P-5'-P-CCNC: 17 U/L (ref 0–50)
ANION GAP SERPL CALCULATED.3IONS-SCNC: 9 MMOL/L (ref 7–15)
AST SERPL W P-5'-P-CCNC: 18 U/L (ref 0–45)
BASOPHILS # BLD AUTO: 0 10E3/UL (ref 0–0.2)
BASOPHILS NFR BLD AUTO: 0 %
BILIRUB SERPL-MCNC: 1 MG/DL
BUN SERPL-MCNC: 11.6 MG/DL (ref 6–20)
CALCIUM SERPL-MCNC: 9.9 MG/DL (ref 8.6–10)
CHLORIDE SERPL-SCNC: 103 MMOL/L (ref 98–107)
CREAT SERPL-MCNC: 0.85 MG/DL (ref 0.51–0.95)
DEPRECATED HCO3 PLAS-SCNC: 28 MMOL/L (ref 22–29)
EGFRCR SERPLBLD CKD-EPI 2021: >90 ML/MIN/1.73M2
EOSINOPHIL # BLD AUTO: 0.2 10E3/UL (ref 0–0.7)
EOSINOPHIL NFR BLD AUTO: 4 %
ERYTHROCYTE [DISTWIDTH] IN BLOOD BY AUTOMATED COUNT: 11.8 % (ref 10–15)
GLUCOSE SERPL-MCNC: 101 MG/DL (ref 70–99)
HCT VFR BLD AUTO: 41.7 % (ref 35–47)
HGB BLD-MCNC: 13.6 G/DL (ref 11.7–15.7)
IMM GRANULOCYTES # BLD: 0 10E3/UL
IMM GRANULOCYTES NFR BLD: 0 %
LYMPHOCYTES # BLD AUTO: 2.3 10E3/UL (ref 0.8–5.3)
LYMPHOCYTES NFR BLD AUTO: 38 %
MCH RBC QN AUTO: 29.3 PG (ref 26.5–33)
MCHC RBC AUTO-ENTMCNC: 32.6 G/DL (ref 31.5–36.5)
MCV RBC AUTO: 90 FL (ref 78–100)
MONOCYTES # BLD AUTO: 0.4 10E3/UL (ref 0–1.3)
MONOCYTES NFR BLD AUTO: 7 %
NEUTROPHILS # BLD AUTO: 3 10E3/UL (ref 1.6–8.3)
NEUTROPHILS NFR BLD AUTO: 51 %
PLATELET # BLD AUTO: 305 10E3/UL (ref 150–450)
POTASSIUM SERPL-SCNC: 4.5 MMOL/L (ref 3.4–5.3)
PROT SERPL-MCNC: 7.4 G/DL (ref 6.4–8.3)
RBC # BLD AUTO: 4.64 10E6/UL (ref 3.8–5.2)
SODIUM SERPL-SCNC: 140 MMOL/L (ref 135–145)
TSH SERPL DL<=0.005 MIU/L-ACNC: 1.46 UIU/ML (ref 0.3–4.2)
WBC # BLD AUTO: 5.9 10E3/UL (ref 4–11)

## 2024-01-30 PROCEDURE — 84443 ASSAY THYROID STIM HORMONE: CPT

## 2024-01-30 PROCEDURE — 99213 OFFICE O/P EST LOW 20 MIN: CPT | Performed by: NURSE PRACTITIONER

## 2024-01-30 PROCEDURE — 80053 COMPREHEN METABOLIC PANEL: CPT

## 2024-01-30 PROCEDURE — 85025 COMPLETE CBC W/AUTO DIFF WBC: CPT

## 2024-01-30 PROCEDURE — 36415 COLL VENOUS BLD VENIPUNCTURE: CPT

## 2024-01-30 RX ORDER — FAMOTIDINE 20 MG/1
20 TABLET, FILM COATED ORAL 2 TIMES DAILY
Qty: 180 TABLET | Refills: 0 | Status: SHIPPED | OUTPATIENT
Start: 2024-01-30

## 2024-01-30 ASSESSMENT — PAIN SCALES - GENERAL: PAINLEVEL: NO PAIN (0)

## 2024-01-30 NOTE — PROGRESS NOTES
"  Assessment & Plan       ICD-10-CM    1. Gastroesophageal reflux disease with esophagitis without hemorrhage  K21.00 Comprehensive metabolic panel (BMP + Alb, Alk Phos, ALT, AST, Total. Bili, TP)     CBC with platelets and differential     famotidine (PEPCID) 20 MG tablet     omeprazole (PRILOSEC) 20 MG DR capsule      2. Globus sensation  R09.A2 TSH with free T4 reflex     famotidine (PEPCID) 20 MG tablet     omeprazole (PRILOSEC) 20 MG DR capsule        Plan starting oral ant acids. Short coarse of omeprazole provided to help with acute symptoms if needed.  Recommend starting famotidine twice daily.  Labs to be updated to assure stability.  If symptoms or not improving with symptomatic management conservative management further evaluation with an endoscopy would be recommended.  She is in agreement this plan.          BMI  Estimated body mass index is 28.9 kg/m  as calculated from the following:    Height as of this encounter: 1.689 m (5' 6.5\").    Weight as of this encounter: 82.5 kg (181 lb 12.8 oz).           Ximena Merchant is a 33 year old, presenting for the following health issues:  Gastrophageal Reflux        1/30/2024     7:04 AM   Additional Questions   Roomed by Betty ROWE MA   Accompanied by Self     History of Present Illness       Reason for visit:  Acid reflux  Symptom onset:  More than a month  Symptoms include:  Burning throat and chest, stomach pain. Feeling of something stuck in throat  Symptom intensity:  Moderate  Symptom progression:  Staying the same  Had these symptoms before:  No  What makes it worse:  Laying down    She eats 2-3 servings of fruits and vegetables daily.She consumes 0 sweetened beverage(s) daily.She exercises with enough effort to increase her heart rate 30 to 60 minutes per day.  She exercises with enough effort to increase her heart rate 4 days per week.   She is taking medications regularly.     *Has tried 14 days of omeprazole-helps temporarily then it returns. " "  On-going for about a year.     Has had upper chest pain with the reflux as well as throat pain. Sore throats were intermittent but resolved with antacid. Now having globus sensation.   No fevers, body aches or chills, denies chest pain that is consistent with cardiac symptoms.     No sob. No vomiting, diarrhea, or nausea.         Review of Systems  Constitutional, HEENT, cardiovascular, pulmonary, gi and gu systems are negative, except as otherwise noted.      Objective    /64   Pulse 57   Resp 16   Ht 1.689 m (5' 6.5\")   Wt 82.5 kg (181 lb 12.8 oz)   SpO2 98%   BMI 28.90 kg/m    Body mass index is 28.9 kg/m .  Physical Exam   GENERAL: alert and no distress  NECK: no adenopathy, no asymmetry, masses, or scars  RESP: lungs clear to auscultation - no rales, rhonchi or wheezes  CV: regular rate and rhythm, normal S1 S2, no S3 or S4, no murmur, click or rub, no peripheral edema  ABDOMEN: soft, nontender, no hepatosplenomegaly, no masses and bowel sounds normal  MS: no gross musculoskeletal defects noted, no edema  PSYCH: mentation appears normal, affect normal/bright          Signed Electronically by: CATIA Latham CNP    "

## 2024-06-16 ENCOUNTER — HEALTH MAINTENANCE LETTER (OUTPATIENT)
Age: 34
End: 2024-06-16

## 2024-06-25 ENCOUNTER — OFFICE VISIT (OUTPATIENT)
Dept: OBGYN | Facility: CLINIC | Age: 34
End: 2024-06-25
Payer: COMMERCIAL

## 2024-06-25 VITALS
BODY MASS INDEX: 29.73 KG/M2 | WEIGHT: 185 LBS | DIASTOLIC BLOOD PRESSURE: 80 MMHG | TEMPERATURE: 97.9 F | HEART RATE: 78 BPM | SYSTOLIC BLOOD PRESSURE: 126 MMHG | HEIGHT: 66 IN | RESPIRATION RATE: 16 BRPM

## 2024-06-25 DIAGNOSIS — Z01.419 ENCOUNTER FOR ANNUAL ROUTINE GYNECOLOGICAL EXAMINATION: Primary | ICD-10-CM

## 2024-06-25 DIAGNOSIS — D06.9 CIN III WITH SEVERE DYSPLASIA: ICD-10-CM

## 2024-06-25 DIAGNOSIS — R87.810 CERVICAL HIGH RISK HPV (HUMAN PAPILLOMAVIRUS) TEST POSITIVE: ICD-10-CM

## 2024-06-25 PROCEDURE — 87624 HPV HI-RISK TYP POOLED RSLT: CPT | Performed by: OBSTETRICS & GYNECOLOGY

## 2024-06-25 PROCEDURE — G0145 SCR C/V CYTO,THINLAYER,RESCR: HCPCS | Performed by: OBSTETRICS & GYNECOLOGY

## 2024-06-25 PROCEDURE — 99395 PREV VISIT EST AGE 18-39: CPT | Performed by: OBSTETRICS & GYNECOLOGY

## 2024-06-25 NOTE — NURSING NOTE
"Initial /80 (BP Location: Right arm, Patient Position: Sitting, Cuff Size: Adult Regular)   Pulse 78   Temp 97.9  F (36.6  C) (Tympanic)   Resp 16   Ht 1.664 m (5' 5.5\")   Wt 83.9 kg (185 lb)   LMP 05/20/2024 (Approximate)   BMI 30.32 kg/m   Estimated body mass index is 30.32 kg/m  as calculated from the following:    Height as of this encounter: 1.664 m (5' 5.5\").    Weight as of this encounter: 83.9 kg (185 lb). .    "

## 2024-06-25 NOTE — PROGRESS NOTES
SUBJECTIVE:   CC: Shonda Garcia is an 34 year old woman who presents for preventive health visit.  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  She wants to get all of her health maintenance up-to-date.    History of SMILE (stratified mucin producing intraepithelial lesion of the cervix) cervical lesion and CIN3 s/p CKC with GYN ONC. Recommended follow up per GYN ONC was pap with co testing and ECC at 12 and 24 months post procedure. This has been completed and was negative. Plan for co testing every 3 years for minimum of 25 years.     Family history of BRCA-1 mutation in her mother, sister, others on her mother's side of family. She has been tested and is negative.    Patient has been advised of split billing requirements and indicates understanding: Yes  Healthy Habits:  Do you get at least three servings of calcium containing foods daily (dairy, green leafy vegetables, etc.)? yes  Amount of exercise or daily activities, outside of work: 3 day(s) per week  Problems taking medications regularly No  Medication side effects: No  Have you had an eye exam in the past two years? yes  Do you see a dentist twice per year? yes  Do you have sleep apnea, excessive snoring or daytime drowsiness?no      Today's PHQ-2 Score:       1/30/2024     6:52 AM 6/14/2022     7:06 AM   PHQ-2 ( 1999 Pfizer)   Q1: Little interest or pleasure in doing things 0 2   Q2: Feeling down, depressed or hopeless 0 2   PHQ-2 Score 0 4   Q1: Little interest or pleasure in doing things Not at all More than half the days   Q2: Feeling down, depressed or hopeless Not at all More than half the days   PHQ-2 Score 0 4       Abuse: Current or Past(Physical, Sexual or Emotional)- No  Do you feel safe in your environment? Yes        Social History     Tobacco Use    Smoking status: Never    Smokeless tobacco: Never   Substance Use Topics    Alcohol use: Yes     Comment: occ     If you drink alcohol do you  typically have >3 drinks per day or >7 drinks per week? No                       Pertinent mammograms are reviewed under the imaging tab. NONE - age  Family history of BRCA1 mutation, she has been tested and is negative.  Would still plan to initiate mammograms at age 41.    History of abnormal Pap smear: YES - reflected in Problem List and Health Maintenance accordingly      Latest Ref Rng & Units 1/13/2022     8:23 AM 10/16/2020    10:45 AM 10/16/2020    10:29 AM   PAP / HPV   PAP  Negative for Intraepithelial Lesion or Malignancy (NILM)      PAP (Historical)    NIL    HPV 16 DNA Negative Negative  Negative     HPV 18 DNA Negative Negative  Negative     Other HR HPV Negative Negative  Negative       Reviewed and updated as needed this visit by clinical staff   Tobacco  Allergies  Meds   Med Hx  Surg Hx  Fam Hx  Soc Hx        Reviewed and updated as needed this visit by Provider                  Past Medical History:   Diagnosis Date    Abnormal Pap smear of cervix 06/11/2018, 2019    see Problem List    Cervical high risk HPV (human papillomavirus) test positive 06/11/2018, 2019    See Problem List    Chlamydia 2009    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 Not itchy.  RESP: NEGATIVE for significant cough or SOB  BREAST: NEGATIVE for masses, tenderness or discharge  CV: NEGATIVE for chest pain, palpitations or peripheral edema  GI:  "NEGATIVE for nausea, abdominal pain, heartburn, or change in bowel habits  : NEGATIVE for unusual urinary or vaginal symptoms. Periods are regular.  MUSCULOSKELETAL: NEGATIVE for significant arthralgias or myalgia  NEURO: NEGATIVE for weakness, dizziness or paresthesias  PSYCHIATRIC: NEGATIVE for changes in mood or affect    OBJECTIVE:   /80 (BP Location: Right arm, Patient Position: Sitting, Cuff Size: Adult Regular)   Pulse 78   Temp 97.9  F (36.6  C) (Tympanic)   Resp 16   Ht 1.664 m (5' 5.5\")   Wt 83.9 kg (185 lb)   LMP 05/20/2024 (Approximate)   BMI 30.32 kg/m    EXAM:  General appearance: well-hydrated, A&O x 3, no apparent distress  Lungs: Equal expansion bilaterally, no accessory muscle use  Heart: No heaves or thrills.   Constitutional: See vitals  Extremities: no edema, erythematous slightly raised linear rash on left inner thigh.   Neuro: CN II-XII grossly intact  Genitourinary:  External genitalia: no erythema, no lesions.   Urethral meatus appropriate location without lesions or prolapse  Urethra: No masses, tenderness, or scarring  Bladder no fullness, masses, or tenderness.  Anus and Perineum: Unremarkable, no visible lesions  Vagina: Normal, healthy pink mucosa without any lesions. Physiologic vaginal discharge.   Cervix: normal appearance, no cervical motion tenderness.   Uterus: normal size, shape and consistency.   Adnexa: no masses or tenderness bilaterally.  Breast: No masses, skin, nipple or axillary changes      ASSESSMENT/PLAN:   Shonda was seen today for physical.    Diagnoses and all orders for this visit:    Encounter for annual routine gynecological examination    URIEL III with severe dysplasia  -     Gynecologic Cytology (Pap) and HPV - Recommended Age 30-65 Years    Cervical high risk HPV (human papillomavirus) test positive  -     Gynecologic Cytology (Pap) and HPV - Recommended Age 30-65 Years        Patient planning to undergo IVF in the near future due to male factor " "infertility.  Getting her health maintenance up-to-date.  Pap smear was collected today due to her history.  Recommend starting multivitamin with folic acid at least 2 months prior to IVF.    Family history of BRCA 1 mutation on her mother side, her mother and sister are both positive.  She has been tested and is negative.      COUNSELING:   Reviewed preventive health counseling, as reflected in patient instructions       Regular exercise       Family planning       Folic Acid    Estimated body mass index is 30.32 kg/m  as calculated from the following:    Height as of this encounter: 1.664 m (5' 5.5\").    Weight as of this encounter: 83.9 kg (185 lb).    Weight management plan: Discussed healthy diet and exercise guidelines    She reports that she has never smoked. She has never used smokeless tobacco.      Counseling Resources:  ATP IV Guidelines  Pooled Cohorts Equation Calculator  Breast Cancer Risk Calculator  BRCA-Related Cancer Risk Assessment: FHS-7 Tool  FRAX Risk Assessment  ICSI Preventive Guidelines  Dietary Guidelines for Americans, 2010  USDA's MyPlate  ASA Prophylaxis  Lung CA Screening    DO MOE Guillen [facility] [place]  "

## 2024-06-26 LAB
HPV HR 12 DNA CVX QL NAA+PROBE: NEGATIVE
HPV16 DNA CVX QL NAA+PROBE: NEGATIVE
HPV18 DNA CVX QL NAA+PROBE: NEGATIVE
HUMAN PAPILLOMA VIRUS FINAL DIAGNOSIS: NORMAL

## 2024-07-01 LAB
BKR LAB AP GYN ADEQUACY: NORMAL
BKR LAB AP GYN INTERPRETATION: NORMAL
BKR LAB AP LMP: NORMAL
BKR LAB AP PREVIOUS ABNL DX: NORMAL
BKR LAB AP PREVIOUS ABNORMAL: NORMAL
PATH REPORT.COMMENTS IMP SPEC: NORMAL
PATH REPORT.COMMENTS IMP SPEC: NORMAL
PATH REPORT.RELEVANT HX SPEC: NORMAL

## 2024-09-03 ENCOUNTER — OFFICE VISIT (OUTPATIENT)
Dept: FAMILY MEDICINE | Facility: CLINIC | Age: 34
End: 2024-09-03
Payer: COMMERCIAL

## 2024-09-03 VITALS
SYSTOLIC BLOOD PRESSURE: 117 MMHG | DIASTOLIC BLOOD PRESSURE: 76 MMHG | HEIGHT: 67 IN | OXYGEN SATURATION: 99 % | WEIGHT: 181 LBS | RESPIRATION RATE: 14 BRPM | BODY MASS INDEX: 28.41 KG/M2 | HEART RATE: 56 BPM

## 2024-09-03 DIAGNOSIS — Z01.818 PREOP GENERAL PHYSICAL EXAM: Primary | ICD-10-CM

## 2024-09-03 DIAGNOSIS — Z31.9 INFERTILITY MANAGEMENT: ICD-10-CM

## 2024-09-03 PROCEDURE — 99214 OFFICE O/P EST MOD 30 MIN: CPT | Performed by: NURSE PRACTITIONER

## 2024-09-03 RX ORDER — FOLLITROPIN 450 [IU]/.75ML
INJECTION, SOLUTION SUBCUTANEOUS
COMMUNITY
Start: 2024-08-22

## 2024-09-03 RX ORDER — MENOTROPINS 75 UNIT
KIT SUBCUTANEOUS
COMMUNITY
Start: 2024-08-22

## 2024-09-03 RX ORDER — FOLLITROPIN 300 [IU]/.5ML
INJECTION, SOLUTION SUBCUTANEOUS
COMMUNITY
Start: 2024-08-22

## 2024-09-03 ASSESSMENT — PAIN SCALES - GENERAL: PAINLEVEL: NO PAIN (0)

## 2024-09-03 NOTE — PATIENT INSTRUCTIONS

## 2024-09-03 NOTE — PROGRESS NOTES
Preoperative Evaluation  [facility]  [address]  [facility] [address]  Phone: [phone]  Primary Provider: Zulay Mark MD  Pre-op Performing Provider: CATIA Latham CNP  Sep 3, 2024               9/3/2024   Surgical Information   What procedure is being done? Ivf egg retrieval   Facility or Hospital where procedure/surgery will be performed:  and [facility]   Who is doing the procedure / surgery? TBD   Date of surgery / procedure: TBD   Time of surgery / procedure: TBD   Where do you plan to recover after surgery? at home with family      Fax number for surgical facility: [phone]    Assessment & Plan     The proposed surgical procedure is considered LOW risk.      ICD-10-CM    1. Preop general physical exam  Z01.818       2. 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             - No identified additional risk factors other than previously addressed    Preoperative Medication Instructions  Antiplatelet or Anticoagulation Medication Instructions   - Patient is on no antiplatelet or anticoagulation medications.    Additional Medication Instructions  Take all scheduled medications on the day of surgery  As directed by infertility specialists.     Recommendation  Approval given to proceed with proposed procedure, without further diagnostic evaluation.    Ximena Merchant is a 34 year old, presenting for the following:  Pre-Op Exam          9/3/2024     8:06 AM   Additional Questions   Roomed by Elsy   Accompanied by self     HPI related to upcoming procedure: IVF egg retrieval.         9/3/2024   Pre-Op Questionnaire   Have you ever had a heart attack or stroke? No   Have you ever had surgery on your heart or blood vessels, such as a stent placement, a coronary artery bypass, or surgery on an artery in your head, neck, heart, or legs? No   Do you have chest  pain with activity? No   Do you have a history of heart failure? No   Do you currently have a cold, bronchitis or symptoms of other infection? No   Do you have a cough, shortness of breath, or wheezing? No   Do you or anyone in your family have previous history of blood clots? No   Do you or does anyone in your family have a serious bleeding problem such as prolonged bleeding following surgeries or cuts? No   Have you ever had problems with anemia or been told to take iron pills? No   Have you had any abnormal blood loss such as black, tarry or bloody stools, or abnormal vaginal bleeding? No   Have you ever had a blood transfusion? No   Have you or any of your relatives ever had problems with anesthesia? No   Do you have sleep apnea, excessive snoring or daytime drowsiness? No   Do you have any artifical heart valves or other implanted medical devices like a pacemaker, defibrillator, or continuous glucose monitor? No   Do you have artificial joints? No   Are you allergic to latex? No      Health Care Directive  Patient does not have a Health Care Directive or Living Will: Discussed advance care planning with patient; information given to patient to review.    Preoperative Review of    reviewed - no record of controlled substances prescribed.      Status of Chronic Conditions:  See problem list for active medical problems.  Problems all longstanding and stable, except as noted/documented.  See ROS for pertinent symptoms related to these conditions.    Patient Active Problem List    Diagnosis Date Noted    Cervical high risk HPV (human papillomavirus) test positive 08/12/2019     Priority: Medium    URIEL III with severe dysplasia 06/11/2018     Priority: Medium     6/11/2018 Pap: ASCUS, +HR HPV type 16 (not 18 or other). Plan [facility]-  6/29/18 [facility] Bx's & ECC - Negative. Plan cotest in 1 year.   8/2/19 ATYP pap - atypical endocervical cells, + HR HPV 16 (Neg 18 & other). Plan colp with ECC.   9/20/19 [facility] Bx - Focal  HSIL, URIEL 2-3 with focal mucinous, consistent with stratified mucin-producing intraepithelial lesion (SMILE). ECC & EMB - benign. Plan  [facility] in the OR.   10/16/20 ECC- negative for dysplasia. NIL pap , Neg HPV done at time of ECC. Plan cotest and ECC per GYN/ONC in one year due bef 10/16/21.  1/13/22 ECC- negative. NIL pap, Neg HPV. Plancotest in 3 years. Cotest in 3 years for 25 years. (see pt 1/14/22 outreach)  6/25/24 NIL pap, Neg HPV. Plan: cotest in 3 years      CARDIOVASCULAR SCREENING; LDL GOAL LESS THAN 160 12/04/2012     Priority: Medium      Past Medical History:   Diagnosis Date    Abnormal Pap smear of cervix 06/11/2018, 2019    see Problem List    Cervical high risk HPV (human papillomavirus) test positive 06/11/2018, 2019    See Problem List    Chlamydia 2009    URIEL III with severe dysplasia 09/20/2019    see problem list    Implantable subdermal contraceptive surveillance 09/07/2014    Placed at [facility]- -  Removed January 2012      Other ankle sprain and strain 08/21/2007     Past Surgical History:   Procedure Laterality Date    APPENDECTOMY  ?2005    open    COLPOSCOPY, CONIZATION, COMBINED N/A 10/18/2019    Procedure: COLPOSCOPY, CERVICAL Conization;  Surgeon: Korin Srivastava MD;  Location:  OR     Current Outpatient Medications   Medication Sig Dispense Refill    GONAL-F RFF REDIJECT 300 UNIT/0.5ML SOPN INJECT 150IU SUBCUTANEOUSLY ONCE DAILY AS DIRECTED      GONAL-F RFF REDIJECT 450 UNT/0.75ML SOPN INJECT 150IU SUBCUTANEOUSLY ONCE DAILY AS DIRECTED      MENOPUR 75 units SC SOLR INJECT 2 VIALS (150IU) SUBCUTANEOUSLY ONCE DAILY AS DIRECTED      famotidine (PEPCID) 20 MG tablet Take 1 tablet (20 mg) by mouth 2 times daily (Patient not taking: Reported on 9/3/2024) 180 tablet 0    omeprazole (PRILOSEC) 20 MG DR capsule Take 1 capsule (20 mg) by mouth daily (Patient not taking: Reported on 9/3/2024) 30 capsule 0       Allergies   Allergen Reactions    Nkda [No Known Drug Allergy]   "       Social History     Tobacco Use    Smoking status: Never    Smokeless tobacco: Never   Substance Use Topics    Alcohol use: Yes     Comment: occ     History reviewed. No pertinent family history.  History   Drug Use No             Review of Systems  Constitutional, neuro, ENT, endocrine, pulmonary, cardiac, gastrointestinal, genitourinary, musculoskeletal, integument and psychiatric systems are negative, except as otherwise noted.    Objective    /76   Pulse 56   Resp 14   Ht 1.689 m (5' 6.5\")   Wt 82.1 kg (181 lb)   LMP 08/23/2024   SpO2 99%   BMI 28.78 kg/m     Estimated body mass index is 28.78 kg/m  as calculated from the following:    Height as of this encounter: 1.689 m (5' 6.5\").    Weight as of this encounter: 82.1 kg (181 lb).    Physical Exam  GENERAL: alert and no distress  EYES: Eyes grossly normal to inspection, PERRL and conjunctivae and sclerae normal  HENT: ear canals and TM's normal, nose and mouth without ulcers or lesions  NECK: no adenopathy, no asymmetry, masses, or scars  RESP: lungs clear to auscultation - no rales, rhonchi or wheezes  CV: regular rate and rhythm, normal S1 S2, no S3 or S4, no murmur, click or rub, no peripheral edema  ABDOMEN: soft, nontender, no hepatosplenomegaly, no masses and bowel sounds normal  MS: no gross musculoskeletal defects noted, no edema  SKIN: no suspicious lesions or rashes  NEURO: Normal strength and tone, mentation intact and speech normal  PSYCH: mentation appears normal, affect normal/bright    Recent Labs   Lab Test 01/30/24  0729   HGB 13.6         POTASSIUM 4.5   CR 0.85        Diagnostics  No labs were ordered during this visit.   No EKG required for low risk surgery (cataract, skin procedure, breast biopsy, etc).    Revised Cardiac Risk Index (RCRI)  The patient has the following serious cardiovascular risks for perioperative complications:   - No serious cardiac risks = 0 points     RCRI Interpretation: 0 points: " Class I (very low risk - 0.4% complication rate)         Signed Electronically by: CATIA Latham CNP  A copy of this evaluation report is provided to the requesting physician.         Answers submitted by the patient for this visit:  General Questionnaire (Submitted on 8/29/2024)  Chief Complaint: Chronic problems general questions HPI Form  What is the reason for your visit today? : Pre Op- IVF egg retrieval  How many days per week do you miss taking your medication?: 0

## 2024-09-06 ENCOUNTER — TELEPHONE (OUTPATIENT)
Dept: FAMILY MEDICINE | Facility: CLINIC | Age: 34
End: 2024-09-06
Payer: COMMERCIAL

## 2024-09-06 NOTE — TELEPHONE ENCOUNTER
FYI - Status Update    Who is Calling: patient    Update: Patient states pre-op visit information was never sent to [facility] and needs to be sent over as soon as possible. Can fax to [facility] at [phone]    Does caller want a call/response back: No unless more information is needed

## 2024-10-11 ENCOUNTER — HOSPITAL ENCOUNTER (EMERGENCY)
Facility: CLINIC | Age: 34
Discharge: HOME | End: 2024-10-11
Attending: EMERGENCY MEDICINE | Admitting: EMERGENCY MEDICINE
Payer: COMMERCIAL

## 2024-10-11 ENCOUNTER — APPOINTMENT (OUTPATIENT)
Dept: GENERAL RADIOLOGY | Facility: CLINIC | Age: 34
End: 2024-10-11
Attending: EMERGENCY MEDICINE
Payer: COMMERCIAL

## 2024-10-11 VITALS
BODY MASS INDEX: 28.93 KG/M2 | HEIGHT: 66 IN | OXYGEN SATURATION: 100 % | TEMPERATURE: 98.7 F | WEIGHT: 180 LBS | RESPIRATION RATE: 17 BRPM | SYSTOLIC BLOOD PRESSURE: 134 MMHG | DIASTOLIC BLOOD PRESSURE: 88 MMHG | HEART RATE: 78 BPM

## 2024-10-11 DIAGNOSIS — J18.9 PNEUMONIA OF LEFT LOWER LOBE DUE TO INFECTIOUS ORGANISM: ICD-10-CM

## 2024-10-11 DIAGNOSIS — R07.9 LEFT-SIDED CHEST PAIN: ICD-10-CM

## 2024-10-11 LAB
ALBUMIN SERPL BCG-MCNC: 4.5 G/DL (ref 3.5–5.2)
ALP SERPL-CCNC: 77 U/L (ref 40–150)
ALT SERPL W P-5'-P-CCNC: 17 U/L (ref 0–50)
ANION GAP SERPL CALCULATED.3IONS-SCNC: 10 MMOL/L (ref 7–15)
AST SERPL W P-5'-P-CCNC: 20 U/L (ref 0–45)
BASOPHILS # BLD AUTO: 0 10E3/UL (ref 0–0.2)
BASOPHILS NFR BLD AUTO: 0 %
BILIRUB DIRECT SERPL-MCNC: <0.2 MG/DL (ref 0–0.3)
BILIRUB SERPL-MCNC: 0.9 MG/DL
BUN SERPL-MCNC: 10.4 MG/DL (ref 6–20)
CALCIUM SERPL-MCNC: 9.5 MG/DL (ref 8.8–10.4)
CHLORIDE SERPL-SCNC: 101 MMOL/L (ref 98–107)
CREAT SERPL-MCNC: 0.79 MG/DL (ref 0.51–0.95)
D DIMER PPP FEU-MCNC: <0.27 UG/ML FEU (ref 0–0.5)
EGFRCR SERPLBLD CKD-EPI 2021: >90 ML/MIN/1.73M2
EOSINOPHIL # BLD AUTO: 0.1 10E3/UL (ref 0–0.7)
EOSINOPHIL NFR BLD AUTO: 1 %
ERYTHROCYTE [DISTWIDTH] IN BLOOD BY AUTOMATED COUNT: 11.8 % (ref 10–15)
GLUCOSE SERPL-MCNC: 107 MG/DL (ref 70–99)
HCG SERPL QL: NEGATIVE
HCO3 SERPL-SCNC: 26 MMOL/L (ref 22–29)
HCT VFR BLD AUTO: 39.3 % (ref 35–47)
HGB BLD-MCNC: 13.8 G/DL (ref 11.7–15.7)
HOLD SPECIMEN: NORMAL
HOLD SPECIMEN: NORMAL
IMM GRANULOCYTES # BLD: 0 10E3/UL
IMM GRANULOCYTES NFR BLD: 0 %
LIPASE SERPL-CCNC: 27 U/L (ref 13–60)
LYMPHOCYTES # BLD AUTO: 1.9 10E3/UL (ref 0.8–5.3)
LYMPHOCYTES NFR BLD AUTO: 14 %
MCH RBC QN AUTO: 30.7 PG (ref 26.5–33)
MCHC RBC AUTO-ENTMCNC: 35.1 G/DL (ref 31.5–36.5)
MCV RBC AUTO: 88 FL (ref 78–100)
MONOCYTES # BLD AUTO: 0.8 10E3/UL (ref 0–1.3)
MONOCYTES NFR BLD AUTO: 6 %
NEUTROPHILS # BLD AUTO: 10.4 10E3/UL (ref 1.6–8.3)
NEUTROPHILS NFR BLD AUTO: 78 %
NRBC # BLD AUTO: 0 10E3/UL
NRBC BLD AUTO-RTO: 0 /100
PLATELET # BLD AUTO: 281 10E3/UL (ref 150–450)
POTASSIUM SERPL-SCNC: 4.1 MMOL/L (ref 3.4–5.3)
PROT SERPL-MCNC: 7.5 G/DL (ref 6.4–8.3)
RBC # BLD AUTO: 4.49 10E6/UL (ref 3.8–5.2)
SODIUM SERPL-SCNC: 137 MMOL/L (ref 135–145)
TROPONIN T SERPL HS-MCNC: <6 NG/L
WBC # BLD AUTO: 13.3 10E3/UL (ref 4–11)

## 2024-10-11 PROCEDURE — 71046 X-RAY EXAM CHEST 2 VIEWS: CPT

## 2024-10-11 PROCEDURE — 99284 EMERGENCY DEPT VISIT MOD MDM: CPT | Performed by: EMERGENCY MEDICINE

## 2024-10-11 PROCEDURE — 82310 ASSAY OF CALCIUM: CPT | Performed by: EMERGENCY MEDICINE

## 2024-10-11 PROCEDURE — 80048 BASIC METABOLIC PNL TOTAL CA: CPT | Performed by: EMERGENCY MEDICINE

## 2024-10-11 PROCEDURE — 85379 FIBRIN DEGRADATION QUANT: CPT | Performed by: EMERGENCY MEDICINE

## 2024-10-11 PROCEDURE — 93010 ELECTROCARDIOGRAM REPORT: CPT | Performed by: EMERGENCY MEDICINE

## 2024-10-11 PROCEDURE — 85004 AUTOMATED DIFF WBC COUNT: CPT | Performed by: EMERGENCY MEDICINE

## 2024-10-11 PROCEDURE — 250N000011 HC RX IP 250 OP 636: Performed by: EMERGENCY MEDICINE

## 2024-10-11 PROCEDURE — 84484 ASSAY OF TROPONIN QUANT: CPT | Performed by: EMERGENCY MEDICINE

## 2024-10-11 PROCEDURE — 99285 EMERGENCY DEPT VISIT HI MDM: CPT | Mod: 25

## 2024-10-11 PROCEDURE — 36415 COLL VENOUS BLD VENIPUNCTURE: CPT | Performed by: FAMILY MEDICINE

## 2024-10-11 PROCEDURE — 82248 BILIRUBIN DIRECT: CPT | Performed by: EMERGENCY MEDICINE

## 2024-10-11 PROCEDURE — 96374 THER/PROPH/DIAG INJ IV PUSH: CPT

## 2024-10-11 PROCEDURE — 84703 CHORIONIC GONADOTROPIN ASSAY: CPT | Performed by: EMERGENCY MEDICINE

## 2024-10-11 PROCEDURE — 93005 ELECTROCARDIOGRAM TRACING: CPT

## 2024-10-11 PROCEDURE — 83690 ASSAY OF LIPASE: CPT | Performed by: EMERGENCY MEDICINE

## 2024-10-11 RX ORDER — KETOROLAC TROMETHAMINE 15 MG/ML
15 INJECTION, SOLUTION INTRAMUSCULAR; INTRAVENOUS ONCE
Status: COMPLETED | OUTPATIENT
Start: 2024-10-11 | End: 2024-10-11

## 2024-10-11 RX ORDER — AZITHROMYCIN 250 MG/1
TABLET, FILM COATED ORAL
Qty: 6 TABLET | Refills: 0 | Status: SHIPPED | OUTPATIENT
Start: 2024-10-11 | End: 2024-10-16

## 2024-10-11 RX ADMIN — KETOROLAC TROMETHAMINE 15 MG: 15 INJECTION, SOLUTION INTRAMUSCULAR; INTRAVENOUS at 15:31

## 2024-10-11 ASSESSMENT — COLUMBIA-SUICIDE SEVERITY RATING SCALE - C-SSRS
2. HAVE YOU ACTUALLY HAD ANY THOUGHTS OF KILLING YOURSELF IN THE PAST MONTH?: NO
6. HAVE YOU EVER DONE ANYTHING, STARTED TO DO ANYTHING, OR PREPARED TO DO ANYTHING TO END YOUR LIFE?: NO
1. IN THE PAST MONTH, HAVE YOU WISHED YOU WERE DEAD OR WISHED YOU COULD GO TO SLEEP AND NOT WAKE UP?: NO
1. IN THE PAST MONTH, HAVE YOU WISHED YOU WERE DEAD OR WISHED YOU COULD GO TO SLEEP AND NOT WAKE UP?: NO
6. HAVE YOU EVER DONE ANYTHING, STARTED TO DO ANYTHING, OR PREPARED TO DO ANYTHING TO END YOUR LIFE?: NO
2. HAVE YOU ACTUALLY HAD ANY THOUGHTS OF KILLING YOURSELF IN THE PAST MONTH?: NO

## 2024-10-11 ASSESSMENT — ACTIVITIES OF DAILY LIVING (ADL)
ADLS_ACUITY_SCORE: 35

## 2024-10-11 NOTE — ED TRIAGE NOTES
"Patient was sitting on couch last clint (2030) when noticed some aching left anterior chest into shoulder.  Today pain in left upper back and flank in addition to left anterior and shoulder.  \"6\"/10 worse with deep breathing, and laying down.  Better with standing.  No cardiac history, no history of PE, no trauma.       Triage Assessment (Adult)       Row Name 10/11/24 0409          Triage Assessment    Airway WDL WDL        Respiratory WDL    Respiratory WDL WDL        Skin Circulation/Temperature WDL    Skin Circulation/Temperature WDL WDL        Cardiac WDL    Cardiac WDL WDL        Peripheral/Neurovascular WDL    Peripheral Neurovascular WDL WDL        Cognitive/Neuro/Behavioral WDL    Cognitive/Neuro/Behavioral WDL WDL                     "

## 2024-10-11 NOTE — ED PROVIDER NOTES
History     Chief Complaint   Patient presents with    Rib Pain     Patient states she began to experience  Rib pain under her left breast and up into her clavicle last night   Pain has intensified today   NO known injury      Chest Pain     HPI  Shonda Garcia is a 34 year old female without significant past medical history presents emerged department complaining of left-sided chest pain.  Symptoms began last night aching pain in the upper chest wall chest and then pain this morning is present in the left lower anterior chest radiating and to the side.  Patient has had no trauma states she has pain with deep breathing denies any fevers or chills has not had any headaches denies any neck pain denies any sensation of significant shortness of breath.  Denies any abdominal pain or back pain has not had any focal numbness weakness any extremity denies any calf pain.  Has not any leg swelling.  There has been no bowel or bladder dysfunction.  No rashes been present.  She has not been on any new medications.    Allergies:  Allergies   Allergen Reactions    Nkda [No Known Drug Allergy]        Problem List:    Patient Active Problem List    Diagnosis Date Noted    Cervical high risk HPV (human papillomavirus) test positive 08/12/2019     Priority: Medium    URIEL III with severe dysplasia 06/11/2018     Priority: Medium     6/11/2018 Pap: ASCUS, +HR HPV type 16 (not 18 or other). Plan [facility]-  6/29/18 [facility] Bx's & ECC - Negative. Plan cotest in 1 year.   8/2/19 ATYP pap - atypical endocervical cells, + HR HPV 16 (Neg 18 & other). Plan colp with ECC.   9/20/19 [facility] Bx - Focal HSIL, URIEL 2-3 with focal mucinous, consistent with stratified mucin-producing intraepithelial lesion (SMILE). ECC & EMB - benign. Plan  [facility] in the OR.   10/16/20 ECC- negative for dysplasia. NIL pap , Neg HPV done at time of ECC. Plan cotest and ECC per GYN/ONC in one year due bef 10/16/21.  1/13/22 ECC- negative. NIL pap, Neg HPV. Plancotest in 3  "years. Cotest in 3 years for 25 years. (see pt 1/14/22 outreach)  6/25/24 NIL pap, Neg HPV. Plan: cotest in 3 years      CARDIOVASCULAR SCREENING; LDL GOAL LESS THAN 160 12/04/2012     Priority: Medium        Past Medical History:    Past Medical History:   Diagnosis Date    Abnormal Pap smear of cervix 06/11/2018, 2019    Cervical high risk HPV (human papillomavirus) test positive 06/11/2018, 2019    Chlamydia 2009    URIEL III with severe dysplasia 09/20/2019    Implantable subdermal contraceptive surveillance 09/07/2014    Other ankle sprain and strain 08/21/2007       Past Surgical History:    Past Surgical History:   Procedure Laterality Date    APPENDECTOMY  ?2005    open    COLPOSCOPY, CONIZATION, COMBINED N/A 10/18/2019    Procedure: COLPOSCOPY, CERVICAL Conization;  Surgeon: Korin Srivastava MD;  Location:  OR       Family History:    No family history on file.    Social History:  Marital Status:   [2]  Social History     Tobacco Use    Smoking status: Never    Smokeless tobacco: Never   Vaping Use    Vaping status: Never Used   Substance Use Topics    Alcohol use: Yes     Comment: occ    Drug use: No        Medications:    famotidine (PEPCID) 20 MG tablet  GONAL-F RFF REDIJECT 300 UNIT/0.5ML SOPN  GONAL-F RFF REDIJECT 450 UNT/0.75ML SOPN  MENOPUR 75 units SC SOLR  omeprazole (PRILOSEC) 20 MG DR capsule          Review of Systems  Per HPI.  Physical Exam   BP: 132/78  Pulse: 85  Temp: 98.8  F (37.1  C)  Resp: 18  Height: 167.6 cm (5' 6\")  Weight: 81.6 kg (180 lb)  SpO2: 99 %      Physical Exam  Vitals and nursing note reviewed.   Constitutional:       General: She is not in acute distress.     Appearance: She is well-developed. She is not ill-appearing, toxic-appearing or diaphoretic.   HENT:      Nose: Nose normal.      Mouth/Throat:      Mouth: Mucous membranes are moist.      Pharynx: Oropharynx is clear.   Eyes:      Conjunctiva/sclera: Conjunctivae normal.   Cardiovascular:      Rate and " Rhythm: Normal rate and regular rhythm.      Pulses: Normal pulses.      Heart sounds: Normal heart sounds. No murmur heard.  Pulmonary:      Effort: Pulmonary effort is normal.      Breath sounds: Normal breath sounds. No stridor. No wheezing or rhonchi.      Comments: Mild tenderness to palpation of the left upper anterior chest and left lower lateral chest wall no erythema edema present no crepitance present.  Abdominal:      General: Abdomen is flat. Bowel sounds are normal. There is no distension.      Palpations: Abdomen is soft.      Tenderness: There is no abdominal tenderness. There is no right CVA tenderness or left CVA tenderness.   Musculoskeletal:         General: Normal range of motion.      Cervical back: Normal range of motion and neck supple.      Right lower leg: No edema.      Left lower leg: No edema.   Skin:     General: Skin is warm and dry.      Findings: No rash.   Neurological:      General: No focal deficit present.      Mental Status: She is alert and oriented to person, place, and time.      Sensory: No sensory deficit.      Motor: No weakness.      Coordination: Coordination normal.   Psychiatric:         Mood and Affect: Mood normal.     ED Course        Procedures              EKG Interpretation:      Interpreted by Gus Adames MD  Rhythm: normal sinus   Rate: normal  Axis: normal  Ectopy: none  Conduction: normal  ST Segments/ T Waves: No ST-T wave changes  Q Waves: none  Comparison to prior: No old EKG available    Clinical Impression: normal EKG      Critical Care time:  none              Results for orders placed or performed during the hospital encounter of 10/11/24 (from the past 24 hour(s))   CBC with Platelets & Differential    Narrative    The following orders were created for panel order CBC with Platelets & Differential.  Procedure                               Abnormality         Status                     ---------                               -----------          ------                     CBC with platelets and d...[[id number]]  Abnormal            Final result                 Please view results for these tests on the individual orders.   Basic metabolic panel   Result Value Ref Range    Sodium 137 135 - 145 mmol/L    Potassium 4.1 3.4 - 5.3 mmol/L    Chloride 101 98 - 107 mmol/L    Carbon Dioxide (CO2) 26 22 - 29 mmol/L    Anion Gap 10 7 - 15 mmol/L    Urea Nitrogen 10.4 6.0 - 20.0 mg/dL    Creatinine 0.79 0.51 - 0.95 mg/dL    GFR Estimate >90 >60 mL/min/1.73m2    Calcium 9.5 8.8 - 10.4 mg/dL    Glucose 107 (H) 70 - 99 mg/dL   Troponin T, High Sensitivity   Result Value Ref Range    Troponin T, High Sensitivity <6 <=14 ng/L   [facility] Draw    Narrative    The following orders were created for panel order [facility]ibel Draw.  Procedure                               Abnormality         Status                     ---------                               -----------         ------                     Extra Red Top Tube[[id number]]                               Final result                 Please view results for these tests on the individual orders.   CBC with platelets and differential   Result Value Ref Range    WBC Count 13.3 (H) 4.0 - 11.0 10e3/uL    RBC Count 4.49 3.80 - 5.20 10e6/uL    Hemoglobin 13.8 11.7 - 15.7 g/dL    Hematocrit 39.3 35.0 - 47.0 %    MCV 88 78 - 100 fL    MCH 30.7 26.5 - 33.0 pg    MCHC 35.1 31.5 - 36.5 g/dL    RDW 11.8 10.0 - 15.0 %    Platelet Count 281 150 - 450 10e3/uL    % Neutrophils 78 %    % Lymphocytes 14 %    % Monocytes 6 %    % Eosinophils 1 %    % Basophils 0 %    % Immature Granulocytes 0 %    NRBCs per 100 WBC 0 <1 /100    Absolute Neutrophils 10.4 (H) 1.6 - 8.3 10e3/uL    Absolute Lymphocytes 1.9 0.8 - 5.3 10e3/uL    Absolute Monocytes 0.8 0.0 - 1.3 10e3/uL    Absolute Eosinophils 0.1 0.0 - 0.7 10e3/uL    Absolute Basophils 0.0 0.0 - 0.2 10e3/uL    Absolute Immature Granulocytes 0.0 <=0.4 10e3/uL    Absolute NRBCs 0.0 10e3/uL   Extra Red Top  Tube   Result Value Ref Range    Hold Specimen JIC    HCG qualitative Blood   Result Value Ref Range    hCG Serum Qualitative Negative Negative   Hepatic panel   Result Value Ref Range    Protein Total 7.5 6.4 - 8.3 g/dL    Albumin 4.5 3.5 - 5.2 g/dL    Bilirubin Total 0.9 <=1.2 mg/dL    Alkaline Phosphatase 77 40 - 150 U/L    AST 20 0 - 45 U/L    ALT 17 0 - 50 U/L    Bilirubin Direct <0.20 0.00 - 0.30 mg/dL   Lipase   Result Value Ref Range    Lipase 27 13 - 60 U/L   [facility] Draw    Narrative    The following orders were created for panel order [facility] Draw.  Procedure                               Abnormality         Status                     ---------                               -----------         ------                     Extra Blue Top Tube[[id number]]                              Final result                 Please view results for these tests on the individual orders.   Extra Blue Top Tube   Result Value Ref Range    Hold Specimen JI    D dimer quantitative   Result Value Ref Range    D-Dimer Quantitative <0.27 0.00 - 0.50 ug/mL FEU    Narrative    This D-dimer assay is intended for use in conjunction with a clinical pretest probability assessment model to exclude pulmonary embolism (PE) and deep venous thrombosis (DVT) in outpatients suspected of PE or DVT. The cut-off value is 0.50 ug/mL FEU.   Chest XR,  PA & LAT    Narrative    CHEST TWO VIEWS 10/11/2024 3:48 PM     HISTORY: L sided chest pain    COMPARISON: None.       Impression    IMPRESSION: Subtle left basilar streak-like opacity could reflect  atelectasis or developing infectious/inflammatory process in the  appropriate clinical setting. No pleural effusion or pneumothorax.  Cardiac mediastinal silhouette is unremarkable.    NOEMI RETANA MD         SYSTEM ID:  [id number]       Medications   ketorolac (TORADOL) injection 15 mg (15 mg Intravenous $Given 10/11/24 0199)       Assessments & Plan (with Medical Decision Making) records were  reviewed including past medical history medications and allergies.  EKG was obtained and was unremarkable.  Labs were obtained.  Chest x-ray was obtained patient was given Toradol for pain.  Reviewed and interpreted labs.  BC was significant for a white count elevated 13.3.  Basic metabolic panel was unremarkable.  Troponin was less than 6.  Panel was unremarkable.  Lipase 27 pregnancy test negative D-dimer less than 0.27.  I independently reviewed the chest x-ray and agree with radiologist findings of subtle left basilar streak-like opacity which could reflect atelectatic changes or developing infectious inflammatory process.  No pleural effusion or pneumothorax or other abnormality.  Findings discussed with patient and  patient has been congested and having an increased cough lately.  She could have an early pneumonia.  I am going to give them a Z-[place] and have her take this as needed.  If any fevers worsening cough shortness of breath or other thing occurs she should antibiotics.  Patient and  feel comfortable with this plan at this time.     I have reviewed the nursing notes.    I have reviewed the findings, diagnosis, plan and need for follow up with the patient.           Discharge Medication List as of 10/11/2024  4:22 PM        START taking these medications    Details   azithromycin (ZITHROMAX Z-BOSTON) 250 MG tablet Two tablets on the first day, then one tablet daily for the next 4 days, Disp-6 tablet, R-0, Local Print             Final diagnoses:   Pneumonia of left lower lobe due to infectious organism - possible vs. atelectasis   Left-sided chest pain       10/11/2024   [facility] EMERGENCY DEPT       Gus Adames MD  10/12/24 0719

## 2024-10-11 NOTE — DISCHARGE INSTRUCTIONS
Return if symptoms worsen or new symptoms develop.  Drink plenty of fluids continue ibuprofen and Tylenol for pain drink plenty of fluids.  Labs without significant abnormality.  Your chest x-ray showed some streaking opacity in the left lower lobe consistent with atelectatic changes which is benign expansion of the lung versus a pneumonia.  If any fevers increased coughing worsening shortness of breath began Z-Roni as directed.

## 2024-11-20 NOTE — PROGRESS NOTES
[facility]    Gynecology Office Note    CC:   Chief Complaint   Patient presents with     Consult     Menstrual cramping        HPI: Shonda Valdivia is a 32 year old  who presents for severe menstrual cramping. Patient reports increasing cramping with menses for the past couple of years. Menses lasts 5-6 days with the cramping the most severe in the first 3 days. Associated with heaviest bleeding. Cramping wakes her up at night. Pain is only at the time of her menses, not currently complaining of any pain as she is not menstruating. She has been trying Ibuprofen with little improvement of symptoms. In the past, she has tried OCPs, Nuvaring, Nexplanon, but has not tolerated hormonal contraception well. Has had many side effects including nausea and mood changes, months of continuous bleeding with Nexplanon. Additionally, she is looking to conceive in the near future. Getting  next October () and would like to try for pregnancy before then. If unable to conceive in the next few months, then will try to delay until after the wedding. She has been using home OPKs and getting positive results.    GYN Hx:       Patient's last menstrual period was 10/31/2022.    Cycle: regular, every 30-35 days  Duration: 5-6 days  Dysmenorrhea: severe cramping for 3 days  Contraception: None, trying to conceive  History of OCP, Nuvaring, and Nexplanon use, as above.    Last Pap Smear:   Lab Results   Component Value Date    PAP NIL 10/16/2020       ROS: A 10 pt ROS was completed and found to be otherwise negative unless mentioned in the HPI.     PMH:   Past Medical History:   Diagnosis Date     Abnormal Pap smear of cervix 2018,     see Problem List     Cervical high risk HPV (human papillomavirus) test positive 2018,     See Problem List     Chlamydia 2009     URIEL III with severe dysplasia 2019    see problem list     Implantable subdermal contraceptive  surveillance 2014    Placed at [facility] 2011 -  Removed 2012       Other ankle sprain and strain 2007       PSHx:   Past Surgical History:   Procedure Laterality Date     APPENDECTOMY  ?    open     COLPOSCOPY, CONIZATION, COMBINED N/A 10/18/2019    Procedure: COLPOSCOPY, CERVICAL Conization;  Surgeon: Korin Srivastava MD;  Location:  OR       OBHx:   OB History    Para Term  AB Living   0 0 0 0 0 0   SAB IAB Ectopic Multiple Live Births   0 0 0 0 0       Medications:   ALPRAZolam (XANAX) 0.25 MG tablet, Take 1-2 tablets (0.25-0.5 mg) by mouth 3 times daily as needed for anxiety or vomiting (Patient not taking: Reported on 2022)  cyclobenzaprine (FLEXERIL) 5 MG tablet, Take 5 mg by mouth (Patient not taking: Reported on 2022)  diclofenac (VOLTAREN) 75 MG EC tablet, Take 75 mg by mouth (Patient not taking: Reported on 2022)  hydrOXYzine (ATARAX) 25 MG tablet, Take 1-2 tablets (25-50 mg) by mouth 3 times daily as needed for anxiety (Patient not taking: Reported on 2022)    No current facility-administered medications on file prior to visit.      Allergies:      Allergies   Allergen Reactions     Nkda [No Known Drug Allergies]        Social History:   Social History     Socioeconomic History     Marital status: Single     Spouse name: Not on file     Number of children: Not on file     Years of education: Not on file     Highest education level: Not on file   Occupational History     Not on file   Tobacco Use     Smoking status: Never     Smokeless tobacco: Never   Vaping Use     Vaping Use: Never used   Substance and Sexual Activity     Alcohol use: Yes     Comment: occ     Drug use: No     Sexual activity: Yes     Partners: Male   Other Topics Concern     Parent/sibling w/ CABG, MI or angioplasty before 65F 55M? No   Social History Narrative     Not on file     Social Determinants of Health     Financial Resource Strain: Not on file   Food  "Insecurity: Not on file   Transportation Needs: Not on file   Physical Activity: Not on file   Stress: Not on file   Social Connections: Not on file   Intimate Partner Violence: Not on file   Housing Stability: Not on file         Family History:   No family history on file.    Physical Exam:   Vitals:    11/08/22 0858   BP: 116/76   BP Location: Left arm   Patient Position: Chair   Cuff Size: Adult Regular   Pulse: 66   Resp: 16   Temp: 98.7  F (37.1  C)   TempSrc: Tympanic   Weight: 81.2 kg (179 lb)   Height: 1.689 m (5' 6.5\")      Estimated body mass index is 28.46 kg/m  as calculated from the following:    Height as of this encounter: 1.689 m (5' 6.5\").    Weight as of this encounter: 81.2 kg (179 lb).    General appearance: well-hydrated, A&O x 3, no apparent distress  Lungs: Equal expansion bilaterally, no accessory muscle use  Heart: No heaves or thrills.   Constitutional: See vitals  Extremities: no edema  Neuro: CN II-XII grossly intact  Genitourinary:  Declines    Labs/Imaging: No pelvic imaging on file    Assessment and Plan:     Encounter Diagnoses   Name Primary?     URIEL III with severe dysplasia      Menorrhagia with regular cycle      Dysmenorrhea Yes     Discussed possible causes of dysmenorrhea and treatment options. She is currently trying to conceive and not interested in treatment options that are contraceptives. As the most severe cramping she is experiencing seems to correlate with the heavies days of flow, will try Lysteda to decrease menstrual bleeding in attempt to also decrease cramping. Also discussed alternative treatments in the future. She has a history of side effects from hormonal contraceptives, but has never tried an IUD. Possible better control of symptoms with decrease systemic side effects.     Also discussed her desire to conceive. She has been doing home OPKs with positive results. Discussed cycle tracking and timing intercourse. Discussed normal interval to conception can be " up to a year.     Health maintenance: History of SMILE cervical lesion and CIN3 s/p CKC with GYN ONC. Recommended follow up per GYN ONC was pap with co testing and ECC at 12 and 24 months post procedure. This has been completed and was negative. Plan for co testing every 3 years for minimum of 25 years.     Return to clinic as needed and for annual exams.    Alyx Fong DO      35 minutes spent on the date of the encounter doing chart review, review of outside records, patient visit and documentation.      Detail Level: Generalized Detail Level: Zone Detail Level: Simple Detail Level: Detailed

## 2025-05-26 ENCOUNTER — PATIENT OUTREACH (OUTPATIENT)
Dept: CARE COORDINATION | Facility: CLINIC | Age: 35
End: 2025-05-26
Payer: COMMERCIAL

## 2025-08-02 ENCOUNTER — HEALTH MAINTENANCE LETTER (OUTPATIENT)
Age: 35
End: 2025-08-02

## (undated) DEVICE — CATH RED RUBBER 14FR LATEX 0094140

## (undated) DEVICE — ESU PENCIL W/SMOKE EVAC ROCKER E2350HS

## (undated) DEVICE — LINEN TOWEL PACK X5 5464

## (undated) DEVICE — BLADE KNIFE SURG 11 371111

## (undated) DEVICE — SU CHROMIC 0 CT 27" 802H

## (undated) DEVICE — SU SILK 0 SH 30" K834H

## (undated) DEVICE — PREP TECHNICARE 08.5OZ C222-8Z

## (undated) DEVICE — SOL WATER IRRIG 1000ML BOTTLE 2F7114

## (undated) DEVICE — COVER CAMERA IN-LIGHT DISP LT-C02

## (undated) DEVICE — SYR 10ML FINGER CONTROL W/O NDL 309695

## (undated) DEVICE — ESU ELEC LEEP BALL 5MM

## (undated) DEVICE — LIGHT HANDLE X1 31140133

## (undated) DEVICE — NDL COUNTER 20CT 31142493

## (undated) DEVICE — SURGICEL HEMOSTAT 4X8" 1952

## (undated) DEVICE — SUCTION MANIFOLD NEPTUNE 2 SYS 4 PORT 0702-020-000

## (undated) DEVICE — GLOVE PROTEXIS W/NEU-THERA 6.5  2D73TE65

## (undated) DEVICE — PACK CYSTO CUSTOM ASC

## (undated) DEVICE — SWAB PROCTO 16" 2/PK 32-046

## (undated) DEVICE — DRSG TELFA 3X8" 1238

## (undated) RX ORDER — PROPOFOL 10 MG/ML
INJECTION, EMULSION INTRAVENOUS
Status: DISPENSED
Start: 2019-10-18

## (undated) RX ORDER — BUPIVACAINE HYDROCHLORIDE 2.5 MG/ML
INJECTION, SOLUTION EPIDURAL; INFILTRATION; INTRACAUDAL
Status: DISPENSED
Start: 2019-10-18

## (undated) RX ORDER — EPHEDRINE SULFATE 50 MG/ML
INJECTION, SOLUTION INTRAMUSCULAR; INTRAVENOUS; SUBCUTANEOUS
Status: DISPENSED
Start: 2019-10-18

## (undated) RX ORDER — DEXAMETHASONE SODIUM PHOSPHATE 4 MG/ML
INJECTION, SOLUTION INTRA-ARTICULAR; INTRALESIONAL; INTRAMUSCULAR; INTRAVENOUS; SOFT TISSUE
Status: DISPENSED
Start: 2019-10-18

## (undated) RX ORDER — LIDOCAINE HYDROCHLORIDE 20 MG/ML
INJECTION, SOLUTION EPIDURAL; INFILTRATION; INTRACAUDAL; PERINEURAL
Status: DISPENSED
Start: 2019-10-18

## (undated) RX ORDER — GABAPENTIN 300 MG/1
CAPSULE ORAL
Status: DISPENSED
Start: 2019-10-18

## (undated) RX ORDER — EPINEPHRINE 1 MG/ML
INJECTION, SOLUTION, CONCENTRATE INTRAVENOUS
Status: DISPENSED
Start: 2019-10-18

## (undated) RX ORDER — FERRIC SUBSULFATE 0.21 G/G
LIQUID TOPICAL
Status: DISPENSED
Start: 2019-10-18

## (undated) RX ORDER — ACETAMINOPHEN 325 MG/1
TABLET ORAL
Status: DISPENSED
Start: 2019-10-18

## (undated) RX ORDER — ACETIC ACID 5 %
LIQUID (ML) MISCELLANEOUS
Status: DISPENSED
Start: 2019-10-18

## (undated) RX ORDER — ONDANSETRON 2 MG/ML
INJECTION INTRAMUSCULAR; INTRAVENOUS
Status: DISPENSED
Start: 2019-10-18

## (undated) RX ORDER — FENTANYL CITRATE 50 UG/ML
INJECTION, SOLUTION INTRAMUSCULAR; INTRAVENOUS
Status: DISPENSED
Start: 2019-10-18

## (undated) RX ORDER — CEFAZOLIN SODIUM 1 G/3ML
INJECTION, POWDER, FOR SOLUTION INTRAMUSCULAR; INTRAVENOUS
Status: DISPENSED
Start: 2019-10-18